# Patient Record
Sex: MALE | Race: WHITE | NOT HISPANIC OR LATINO
[De-identification: names, ages, dates, MRNs, and addresses within clinical notes are randomized per-mention and may not be internally consistent; named-entity substitution may affect disease eponyms.]

---

## 2017-03-30 ENCOUNTER — APPOINTMENT (OUTPATIENT)
Dept: PULMONOLOGY | Facility: CLINIC | Age: 52
End: 2017-03-30

## 2017-03-30 VITALS
HEIGHT: 70 IN | SYSTOLIC BLOOD PRESSURE: 120 MMHG | WEIGHT: 240 LBS | HEART RATE: 84 BPM | RESPIRATION RATE: 17 BRPM | BODY MASS INDEX: 34.36 KG/M2 | DIASTOLIC BLOOD PRESSURE: 80 MMHG | OXYGEN SATURATION: 97 %

## 2017-04-07 ENCOUNTER — APPOINTMENT (OUTPATIENT)
Dept: PSYCHIATRY | Facility: CLINIC | Age: 52
End: 2017-04-07

## 2017-04-07 DIAGNOSIS — Z87.891 PERSONAL HISTORY OF NICOTINE DEPENDENCE: ICD-10-CM

## 2017-04-07 RX ORDER — TOPIRAMATE 25 MG/1
25 TABLET, FILM COATED ORAL
Qty: 120 | Refills: 0 | Status: ACTIVE | COMMUNITY
Start: 2016-12-12

## 2017-04-07 RX ORDER — CLONAZEPAM 0.5 MG/1
0.5 TABLET ORAL
Qty: 30 | Refills: 0 | Status: COMPLETED | COMMUNITY
Start: 2016-10-28

## 2017-04-07 RX ORDER — ACETAMINOPHEN AND CODEINE 300; 30 MG/1; MG/1
300-30 TABLET ORAL
Qty: 10 | Refills: 0 | Status: COMPLETED | COMMUNITY
Start: 2016-10-19

## 2017-04-07 RX ORDER — CELECOXIB 100 MG/1
100 CAPSULE ORAL
Qty: 30 | Refills: 0 | Status: COMPLETED | COMMUNITY
Start: 2016-11-15

## 2017-04-07 RX ORDER — LORAZEPAM 1 MG/1
1 TABLET ORAL
Qty: 3 | Refills: 0 | Status: COMPLETED | COMMUNITY
Start: 2016-10-19

## 2017-04-07 RX ORDER — AMOXICILLIN 875 MG/1
875 TABLET, FILM COATED ORAL
Qty: 20 | Refills: 0 | Status: COMPLETED | COMMUNITY
Start: 2016-10-19

## 2017-04-11 ENCOUNTER — OUTPATIENT (OUTPATIENT)
Dept: OUTPATIENT SERVICES | Facility: HOSPITAL | Age: 52
LOS: 1 days | End: 2017-04-11
Payer: MEDICARE

## 2017-04-11 ENCOUNTER — APPOINTMENT (OUTPATIENT)
Dept: ULTRASOUND IMAGING | Facility: IMAGING CENTER | Age: 52
End: 2017-04-11

## 2017-04-11 DIAGNOSIS — Z00.8 ENCOUNTER FOR OTHER GENERAL EXAMINATION: ICD-10-CM

## 2017-04-11 PROCEDURE — 76700 US EXAM ABDOM COMPLETE: CPT

## 2017-05-02 ENCOUNTER — APPOINTMENT (OUTPATIENT)
Dept: PSYCHIATRY | Facility: CLINIC | Age: 52
End: 2017-05-02

## 2017-05-02 RX ORDER — COLCHICINE 0.6 MG/1
0.6 TABLET, FILM COATED ORAL
Qty: 10 | Refills: 0 | Status: ACTIVE | COMMUNITY
Start: 2017-04-25

## 2017-05-25 ENCOUNTER — APPOINTMENT (OUTPATIENT)
Dept: PSYCHIATRY | Facility: CLINIC | Age: 52
End: 2017-05-25

## 2017-06-06 ENCOUNTER — RX RENEWAL (OUTPATIENT)
Age: 52
End: 2017-06-06

## 2017-06-08 ENCOUNTER — APPOINTMENT (OUTPATIENT)
Dept: PSYCHIATRY | Facility: CLINIC | Age: 52
End: 2017-06-08

## 2017-06-08 RX ORDER — ESCITALOPRAM OXALATE 20 MG/1
20 TABLET ORAL
Qty: 30 | Refills: 0 | Status: COMPLETED | COMMUNITY
Start: 2017-05-02

## 2017-06-29 ENCOUNTER — APPOINTMENT (OUTPATIENT)
Dept: PSYCHIATRY | Facility: CLINIC | Age: 52
End: 2017-06-29

## 2017-07-20 ENCOUNTER — APPOINTMENT (OUTPATIENT)
Dept: PSYCHIATRY | Facility: CLINIC | Age: 52
End: 2017-07-20

## 2017-07-20 RX ORDER — DULOXETINE HYDROCHLORIDE 20 MG/1
20 CAPSULE, DELAYED RELEASE PELLETS ORAL
Qty: 30 | Refills: 0 | Status: COMPLETED | COMMUNITY
Start: 2017-06-08

## 2017-07-24 ENCOUNTER — RX RENEWAL (OUTPATIENT)
Age: 52
End: 2017-07-24

## 2017-07-24 ENCOUNTER — FORM ENCOUNTER (OUTPATIENT)
Age: 52
End: 2017-07-24

## 2017-07-25 ENCOUNTER — APPOINTMENT (OUTPATIENT)
Dept: CT IMAGING | Facility: IMAGING CENTER | Age: 52
End: 2017-07-25

## 2017-07-25 ENCOUNTER — OUTPATIENT (OUTPATIENT)
Dept: OUTPATIENT SERVICES | Facility: HOSPITAL | Age: 52
LOS: 1 days | End: 2017-07-25
Payer: MEDICARE

## 2017-07-25 DIAGNOSIS — R93.8 ABNORMAL FINDINGS ON DIAGNOSTIC IMAGING OF OTHER SPECIFIED BODY STRUCTURES: ICD-10-CM

## 2017-07-25 PROCEDURE — 71250 CT THORAX DX C-: CPT

## 2017-07-26 ENCOUNTER — RESULT REVIEW (OUTPATIENT)
Age: 52
End: 2017-07-26

## 2017-08-10 ENCOUNTER — APPOINTMENT (OUTPATIENT)
Dept: PSYCHIATRY | Facility: CLINIC | Age: 52
End: 2017-08-10
Payer: MEDICARE

## 2017-08-10 PROCEDURE — 99214 OFFICE O/P EST MOD 30 MIN: CPT

## 2017-08-31 ENCOUNTER — APPOINTMENT (OUTPATIENT)
Dept: PSYCHIATRY | Facility: CLINIC | Age: 52
End: 2017-08-31
Payer: MEDICARE

## 2017-08-31 PROCEDURE — 99214 OFFICE O/P EST MOD 30 MIN: CPT

## 2017-09-21 ENCOUNTER — APPOINTMENT (OUTPATIENT)
Dept: PSYCHIATRY | Facility: CLINIC | Age: 52
End: 2017-09-21
Payer: MEDICARE

## 2017-09-21 PROCEDURE — 99214 OFFICE O/P EST MOD 30 MIN: CPT

## 2017-09-21 RX ORDER — BENZONATATE 200 MG/1
200 CAPSULE ORAL
Qty: 15 | Refills: 0 | Status: COMPLETED | COMMUNITY
Start: 2017-06-01

## 2017-09-21 RX ORDER — DULOXETINE HYDROCHLORIDE 30 MG/1
30 CAPSULE, DELAYED RELEASE PELLETS ORAL
Qty: 30 | Refills: 0 | Status: COMPLETED | COMMUNITY
Start: 2017-06-29

## 2017-10-03 ENCOUNTER — MEDICATION RENEWAL (OUTPATIENT)
Age: 52
End: 2017-10-03

## 2017-10-05 ENCOUNTER — APPOINTMENT (OUTPATIENT)
Dept: PSYCHIATRY | Facility: CLINIC | Age: 52
End: 2017-10-05
Payer: MEDICARE

## 2017-10-05 PROCEDURE — 99214 OFFICE O/P EST MOD 30 MIN: CPT

## 2017-10-16 ENCOUNTER — RX RENEWAL (OUTPATIENT)
Age: 52
End: 2017-10-16

## 2017-10-19 ENCOUNTER — APPOINTMENT (OUTPATIENT)
Dept: PSYCHIATRY | Facility: CLINIC | Age: 52
End: 2017-10-19
Payer: MEDICARE

## 2017-10-19 PROCEDURE — 99214 OFFICE O/P EST MOD 30 MIN: CPT

## 2017-10-31 ENCOUNTER — APPOINTMENT (OUTPATIENT)
Dept: PULMONOLOGY | Facility: CLINIC | Age: 52
End: 2017-10-31
Payer: MEDICARE

## 2017-10-31 VITALS
HEIGHT: 68 IN | RESPIRATION RATE: 15 BRPM | SYSTOLIC BLOOD PRESSURE: 110 MMHG | HEART RATE: 102 BPM | OXYGEN SATURATION: 97 % | BODY MASS INDEX: 38.19 KG/M2 | DIASTOLIC BLOOD PRESSURE: 70 MMHG | WEIGHT: 252 LBS

## 2017-10-31 PROCEDURE — 99214 OFFICE O/P EST MOD 30 MIN: CPT | Mod: 25

## 2017-10-31 PROCEDURE — 94010 BREATHING CAPACITY TEST: CPT

## 2017-10-31 RX ORDER — FLUTICASONE FUROATE 200 UG/1
200 POWDER RESPIRATORY (INHALATION)
Qty: 3 | Refills: 1 | Status: ACTIVE | COMMUNITY
Start: 2017-10-31 | End: 1900-01-01

## 2017-11-30 ENCOUNTER — APPOINTMENT (OUTPATIENT)
Dept: PSYCHIATRY | Facility: CLINIC | Age: 52
End: 2017-11-30
Payer: MEDICARE

## 2017-11-30 PROCEDURE — 99214 OFFICE O/P EST MOD 30 MIN: CPT

## 2017-11-30 RX ORDER — DULOXETINE HYDROCHLORIDE 30 MG/1
30 CAPSULE, DELAYED RELEASE PELLETS ORAL
Qty: 30 | Refills: 0 | Status: COMPLETED | COMMUNITY
Start: 2017-09-21 | End: 2017-11-30

## 2017-11-30 RX ORDER — GABAPENTIN 100 MG/1
100 CAPSULE ORAL
Qty: 180 | Refills: 0 | Status: COMPLETED | COMMUNITY
Start: 2017-09-21

## 2017-12-14 ENCOUNTER — APPOINTMENT (OUTPATIENT)
Dept: PSYCHIATRY | Facility: CLINIC | Age: 52
End: 2017-12-14

## 2018-02-23 ENCOUNTER — APPOINTMENT (OUTPATIENT)
Dept: PSYCHIATRY | Facility: CLINIC | Age: 53
End: 2018-02-23
Payer: MEDICARE

## 2018-02-23 VITALS — HEART RATE: 98 BPM | SYSTOLIC BLOOD PRESSURE: 117 MMHG | DIASTOLIC BLOOD PRESSURE: 81 MMHG

## 2018-02-23 PROCEDURE — 99214 OFFICE O/P EST MOD 30 MIN: CPT

## 2018-02-23 RX ORDER — ALLOPURINOL 300 MG/1
300 TABLET ORAL
Qty: 90 | Refills: 0 | Status: ACTIVE | COMMUNITY
Start: 2018-01-18

## 2018-02-23 RX ORDER — GABAPENTIN 300 MG/1
300 CAPSULE ORAL TWICE DAILY
Qty: 60 | Refills: 1 | Status: COMPLETED | COMMUNITY
Start: 2017-08-10 | End: 2018-02-23

## 2018-02-23 RX ORDER — BENZONATATE 100 MG/1
100 CAPSULE ORAL
Qty: 21 | Refills: 0 | Status: COMPLETED | COMMUNITY
Start: 2018-02-13

## 2018-03-06 ENCOUNTER — APPOINTMENT (OUTPATIENT)
Dept: PULMONOLOGY | Facility: CLINIC | Age: 53
End: 2018-03-06
Payer: MEDICARE

## 2018-03-06 VITALS
WEIGHT: 248 LBS | DIASTOLIC BLOOD PRESSURE: 80 MMHG | OXYGEN SATURATION: 96 % | RESPIRATION RATE: 17 BRPM | HEART RATE: 98 BPM | SYSTOLIC BLOOD PRESSURE: 110 MMHG | HEIGHT: 70 IN | BODY MASS INDEX: 35.5 KG/M2

## 2018-03-06 DIAGNOSIS — Z15.01 GENETIC SUSCEPTIBILITY TO MALIGNANT NEOPLASM OF BREAST: ICD-10-CM

## 2018-03-06 DIAGNOSIS — Z15.09 GENETIC SUSCEPTIBILITY TO MALIGNANT NEOPLASM OF BREAST: ICD-10-CM

## 2018-03-06 PROCEDURE — 99214 OFFICE O/P EST MOD 30 MIN: CPT | Mod: 25

## 2018-03-06 PROCEDURE — 94010 BREATHING CAPACITY TEST: CPT

## 2018-03-19 ENCOUNTER — APPOINTMENT (OUTPATIENT)
Dept: PSYCHIATRY | Facility: CLINIC | Age: 53
End: 2018-03-19
Payer: MEDICARE

## 2018-03-19 PROCEDURE — 99214 OFFICE O/P EST MOD 30 MIN: CPT

## 2018-05-17 ENCOUNTER — APPOINTMENT (OUTPATIENT)
Dept: PSYCHIATRY | Facility: CLINIC | Age: 53
End: 2018-05-17
Payer: MEDICARE

## 2018-05-17 PROCEDURE — 99214 OFFICE O/P EST MOD 30 MIN: CPT

## 2018-07-10 ENCOUNTER — APPOINTMENT (OUTPATIENT)
Dept: PULMONOLOGY | Facility: CLINIC | Age: 53
End: 2018-07-10
Payer: MEDICARE

## 2018-07-10 ENCOUNTER — NON-APPOINTMENT (OUTPATIENT)
Age: 53
End: 2018-07-10

## 2018-07-10 VITALS
BODY MASS INDEX: 36.14 KG/M2 | RESPIRATION RATE: 14 BRPM | OXYGEN SATURATION: 95 % | WEIGHT: 244 LBS | HEART RATE: 96 BPM | HEIGHT: 69 IN | SYSTOLIC BLOOD PRESSURE: 122 MMHG | DIASTOLIC BLOOD PRESSURE: 80 MMHG

## 2018-07-10 PROCEDURE — 99214 OFFICE O/P EST MOD 30 MIN: CPT | Mod: 25

## 2018-07-10 PROCEDURE — 94010 BREATHING CAPACITY TEST: CPT

## 2018-07-26 ENCOUNTER — APPOINTMENT (OUTPATIENT)
Dept: PSYCHIATRY | Facility: CLINIC | Age: 53
End: 2018-07-26
Payer: MEDICARE

## 2018-07-26 PROCEDURE — 99214 OFFICE O/P EST MOD 30 MIN: CPT

## 2018-09-28 ENCOUNTER — RX RENEWAL (OUTPATIENT)
Age: 53
End: 2018-09-28

## 2018-10-11 ENCOUNTER — APPOINTMENT (OUTPATIENT)
Dept: PSYCHIATRY | Facility: CLINIC | Age: 53
End: 2018-10-11
Payer: MEDICARE

## 2018-10-11 PROCEDURE — 99214 OFFICE O/P EST MOD 30 MIN: CPT

## 2018-11-13 ENCOUNTER — APPOINTMENT (OUTPATIENT)
Dept: PULMONOLOGY | Facility: CLINIC | Age: 53
End: 2018-11-13
Payer: MEDICARE

## 2018-11-13 ENCOUNTER — NON-APPOINTMENT (OUTPATIENT)
Age: 53
End: 2018-11-13

## 2018-11-13 VITALS
WEIGHT: 243 LBS | OXYGEN SATURATION: 95 % | DIASTOLIC BLOOD PRESSURE: 70 MMHG | BODY MASS INDEX: 34.79 KG/M2 | HEIGHT: 70 IN | RESPIRATION RATE: 14 BRPM | HEART RATE: 91 BPM | SYSTOLIC BLOOD PRESSURE: 120 MMHG

## 2018-11-13 DIAGNOSIS — Z23 ENCOUNTER FOR IMMUNIZATION: ICD-10-CM

## 2018-11-13 PROCEDURE — 90682 RIV4 VACC RECOMBINANT DNA IM: CPT

## 2018-11-13 PROCEDURE — G0008: CPT

## 2018-11-13 PROCEDURE — 99214 OFFICE O/P EST MOD 30 MIN: CPT | Mod: 25

## 2018-11-13 PROCEDURE — 94060 EVALUATION OF WHEEZING: CPT

## 2018-11-13 PROCEDURE — 94729 DIFFUSING CAPACITY: CPT

## 2018-11-13 PROCEDURE — 94727 GAS DIL/WSHOT DETER LNG VOL: CPT

## 2019-01-02 ENCOUNTER — MEDICATION RENEWAL (OUTPATIENT)
Age: 54
End: 2019-01-02

## 2019-01-08 ENCOUNTER — APPOINTMENT (OUTPATIENT)
Dept: PSYCHIATRY | Facility: CLINIC | Age: 54
End: 2019-01-08
Payer: MEDICARE

## 2019-01-08 PROCEDURE — 99214 OFFICE O/P EST MOD 30 MIN: CPT

## 2019-01-15 ENCOUNTER — MEDICATION RENEWAL (OUTPATIENT)
Age: 54
End: 2019-01-15

## 2019-03-18 ENCOUNTER — APPOINTMENT (OUTPATIENT)
Dept: PULMONOLOGY | Facility: CLINIC | Age: 54
End: 2019-03-18
Payer: MEDICARE

## 2019-03-18 ENCOUNTER — NON-APPOINTMENT (OUTPATIENT)
Age: 54
End: 2019-03-18

## 2019-03-18 VITALS
SYSTOLIC BLOOD PRESSURE: 116 MMHG | BODY MASS INDEX: 35.61 KG/M2 | WEIGHT: 235 LBS | DIASTOLIC BLOOD PRESSURE: 70 MMHG | RESPIRATION RATE: 17 BRPM | OXYGEN SATURATION: 97 % | HEART RATE: 78 BPM | HEIGHT: 68 IN

## 2019-03-18 PROCEDURE — 99214 OFFICE O/P EST MOD 30 MIN: CPT | Mod: 25

## 2019-03-18 PROCEDURE — 94010 BREATHING CAPACITY TEST: CPT

## 2019-03-18 NOTE — REVIEW OF SYSTEMS
[Recent Wt Loss (___ Lbs)] : recent [unfilled] ~Ulb weight loss [Dyspnea] : dyspnea [Rash] : [unfilled] rash [Itch] : itching [As Noted in HPI] : as noted in HPI [Anxiety] : anxiety [Negative] : Sleep Disorder

## 2019-03-18 NOTE — PROCEDURE
[FreeTextEntry1] : PFT revealed mild restrictive dysfunction, normal flows, with a FEV1 of 2.83  ,which is 74 % of predicted, with a flattened inspiratory limb \par

## 2019-03-18 NOTE — PHYSICAL EXAM
[General Appearance - Well Developed] : well developed [Normal Appearance] : normal appearance [Well Groomed] : well groomed [General Appearance - Well Nourished] : well nourished [No Deformities] : no deformities [General Appearance - In No Acute Distress] : no acute distress [Normal Conjunctiva] : the conjunctiva exhibited no abnormalities [Eyelids - No Xanthelasma] : the eyelids demonstrated no xanthelasmas [Normal Oropharynx] : normal oropharynx [III] : III [Neck Appearance] : the appearance of the neck was normal [Neck Cervical Mass (___cm)] : no neck mass was observed [Jugular Venous Distention Increased] : there was no jugular-venous distention [Thyroid Diffuse Enlargement] : the thyroid was not enlarged [Thyroid Nodule] : there were no palpable thyroid nodules [Heart Rate And Rhythm] : heart rate and rhythm were normal [Heart Sounds] : normal S1 and S2 [Murmurs] : no murmurs present [Respiration, Rhythm And Depth] : normal respiratory rhythm and effort [Exaggerated Use Of Accessory Muscles For Inspiration] : no accessory muscle use [Auscultation Breath Sounds / Voice Sounds] : lungs were clear to auscultation bilaterally [Abdomen Soft] : soft [Abdomen Tenderness] : non-tender [Abdomen Mass (___ Cm)] : no abdominal mass palpated [Abnormal Walk] : normal gait [Gait - Sufficient For Exercise Testing] : the gait was sufficient for exercise testing [Nail Clubbing] : no clubbing of the fingernails [Cyanosis, Localized] : no localized cyanosis [Petechial Hemorrhages (___cm)] : no petechial hemorrhages [Skin Color & Pigmentation] : normal skin color and pigmentation [] : no rash [No Venous Stasis] : no venous stasis [Skin Lesions] : no skin lesions [No Skin Ulcers] : no skin ulcer [No Xanthoma] : no  xanthoma was observed [Deep Tendon Reflexes (DTR)] : deep tendon reflexes were 2+ and symmetric [Sensation] : the sensory exam was normal to light touch and pinprick [No Focal Deficits] : no focal deficits [Oriented To Time, Place, And Person] : oriented to person, place, and time [Impaired Insight] : insight and judgment were intact [Affect] : the affect was normal [FreeTextEntry1] : rash on leg

## 2019-03-18 NOTE — ADDENDUM
[FreeTextEntry1] : Documented by Guy Delgadillo acting as a scribe for Dr. Adam Blandon on 03/18/2019 \par \par All medical record entries made by the Scribe were at my, Dr. Adam Blandon's, direction and personally dictated by me on 03/18/2019  . I have reviewed the chart and agree that the record accurately reflects my personal performance of the history, physical exam, procedure, assessment and plan. I have also personally directed, reviewed, and agree with the discharge instructions. \par \par

## 2019-03-18 NOTE — ASSESSMENT
[FreeTextEntry1] : Mr. Lombardo has a history of asthma, allergies, GERD, primary snoring and shortness of breath. He is stable from a pulmonary perspective, aside from some shortness of breath at rest (?posture/ stress)\par \par His shortness of breath is multifactorial due to:\par -poor breathing mechanics\par -asthma\par -overweight\par -out of shape\par -anxiety\par -? CAD\par \par problem 1: asthma\par -continue to use Anoro at 1 inhalation QD\par -continue to use Arnuity 200 at 1 inhalation QD\par -continue Singulair 10 mg QHS \par -continue Ventolin 2 puffs QHS\par -Asthma is  believed to be caused by inherited (genetic) and environmental factor, but its exact cause is unknown. Asthma may be triggered by allergens, lung infections, or irritants in the air. Asthma triggers are different for each person\par -Inhaler technique reviewed as well as oral hygiene techniques reviewed with patient. Avoidance of cold air, extremes of temperature, rescue inhaler should be used before exercise. Order of medication reviewed with patient. Recommended use of a cool mist humidifier in the bedroom.\par \par problem 2: GERD, hiatal hernia \par -continue to use Omeprazole 40 mg before breakfast\par -Rule of 2s: avoid eating too much, eating too late, eating too spicy, eating two hours before bed\par -Things to avoid including overeating, spicy foods, tight clothing, eating within three hours of bed, this list is not all inclusive. \par -For treatment of reflux, possible options discussed including diet control, H2 blockers, PPIs, as well as coating motility agents discussed as treatment options. Timing of meals and proximity of last meal to sleep were discussed. If symptoms persist, a formal gastrointestinal evaluation is needed.\par \par problem 3: primary snoring\par -recommended to use Oxy Aid\par \par problem 4: poor sleep\par -f/u with psychiatrist\par -recommended Sleep Guard \par \par problem 5: allergies\par -continue to use Clarinex 5 mg before bed\par -Environmental measures for allergies were encouraged including mattress and pillow cover, air purifier, and environmental controls.\par \par problem 6: poor breathing mechanics\par -Proper breathing techniques were reviewed with an emphasis of exhalation. Patient instructed to breath in for 1 second and out for four seconds. Patient was encouraged to not talk while walking.\par \par problem 7: obesity \par -Weight loss, exercise, and diet control were discussed and are highly encouraged. Treatment options were given such as, aqua therapy, and contacting a nutritionist. Recommended to use the elliptical, stationary bike, less use of treadmill.  Obesity is associated with worsening asthma, shortness of breath, and potential for cardiac disease, diabetes, and other underlying medical conditions.\par \par problem 8: anxiety\par -recommended to see Dr. Gu \par -recommended to read "The Gift of Maybe"\par \par problem 9: health maintenance\par -recommended Well care and cancer screening in the face of BRCA positive (Hematology evaluation pending) \par -s/p 2018 flu shot (given in office 11.13.18)\par -recommended strep pneumonia vaccines: Prevnar-13 vaccine, followed by Pneumo vaccine 23 on year following\par -recommended early intervention for URIs\par -recommended osteoporosis evaluations\par -recommended early dermatological evaluations\par -recommended after the age of 50 to the age of 70, colonoscopy every 5 years\par \par F/U in 3 months\par He is encouraged to call with any changes, concerns, or questions.

## 2019-03-18 NOTE — REASON FOR VISIT
[Follow-Up] : a follow-up visit [FreeTextEntry1] : abnormal chest CT, abnormal PFTs, asthma, GERD, lung fibrosis, poor sleep, primary snoring and shortness of breath

## 2019-04-02 ENCOUNTER — APPOINTMENT (OUTPATIENT)
Dept: PSYCHIATRY | Facility: CLINIC | Age: 54
End: 2019-04-02
Payer: MEDICARE

## 2019-04-02 PROCEDURE — 99214 OFFICE O/P EST MOD 30 MIN: CPT

## 2019-07-02 ENCOUNTER — APPOINTMENT (OUTPATIENT)
Dept: PSYCHIATRY | Facility: CLINIC | Age: 54
End: 2019-07-02
Payer: MEDICARE

## 2019-07-02 ENCOUNTER — RX RENEWAL (OUTPATIENT)
Age: 54
End: 2019-07-02

## 2019-07-02 PROCEDURE — 99214 OFFICE O/P EST MOD 30 MIN: CPT

## 2019-07-18 ENCOUNTER — NON-APPOINTMENT (OUTPATIENT)
Age: 54
End: 2019-07-18

## 2019-07-18 ENCOUNTER — APPOINTMENT (OUTPATIENT)
Dept: PULMONOLOGY | Facility: CLINIC | Age: 54
End: 2019-07-18
Payer: MEDICARE

## 2019-07-18 VITALS
HEIGHT: 68 IN | BODY MASS INDEX: 35.16 KG/M2 | HEART RATE: 71 BPM | OXYGEN SATURATION: 98 % | DIASTOLIC BLOOD PRESSURE: 70 MMHG | WEIGHT: 232 LBS | SYSTOLIC BLOOD PRESSURE: 130 MMHG | RESPIRATION RATE: 17 BRPM

## 2019-07-18 PROCEDURE — 99214 OFFICE O/P EST MOD 30 MIN: CPT | Mod: 25

## 2019-07-18 PROCEDURE — 94010 BREATHING CAPACITY TEST: CPT

## 2019-07-18 NOTE — REASON FOR VISIT
[Follow-Up] : a follow-up visit [FreeTextEntry1] : abnormal chest CT/PFT, allergic rhinitis, asthma, fibrosis lung, GERD, obesity, poor sleep, primary snoring, and SOB

## 2019-07-18 NOTE — ASSESSMENT
[FreeTextEntry1] : Mr. Lombardo has a history of asthma, allergies, GERD, primary snoring and shortness of breath. He is stable from a pulmonary perspective, aside from some shortness of breath at rest (?posture/ stress) - His number one issue is anxiety. \par \par His shortness of breath is multifactorial due to:\par -poor breathing mechanics\par -asthma\par -overweight / out of shape\par -anxiety\par -? CAD\par \par problem 1: asthma - (active due to climate)\par -continue to use Anoro at 1 inhalation QD\par -continue to use Arnuity 200 at 1 inhalation QD\par -continue Singulair 10 mg QHS \par -continue Ventolin 2 puffs QHS\par -Asthma is  believed to be caused by inherited (genetic) and environmental factor, but its exact cause is unknown. Asthma may be triggered by allergens, lung infections, or irritants in the air. Asthma triggers are different for each person\par -Inhaler technique reviewed as well as oral hygiene techniques reviewed with patient. Avoidance of cold air, extremes of temperature, rescue inhaler should be used before exercise. Order of medication reviewed with patient. Recommended use of a cool mist humidifier in the bedroom.\par \par problem 2: GERD, hiatal hernia \par -continue to use Omeprazole 40 mg before breakfast\par -Rule of 2s: avoid eating too much, eating too late, eating too spicy, eating two hours before bed\par -Things to avoid including overeating, spicy foods, tight clothing, eating within three hours of bed, this list is not all inclusive. \par -For treatment of reflux, possible options discussed including diet control, H2 blockers, PPIs, as well as coating motility agents discussed as treatment options. Timing of meals and proximity of last meal to sleep were discussed. If symptoms persist, a formal gastrointestinal evaluation is needed.\par \par problem 3: primary snoring\par -recommended to use Oxy Aid\par \par problem 4: poor sleep\par -f/u with psychiatrist\par -recommended Sleep Guard \par \par problem 5: allergies\par -continue to use Clarinex 5 mg before bed\par -Environmental measures for allergies were encouraged including mattress and pillow cover, air purifier, and environmental controls.\par \par problem 6: poor breathing mechanics\par -Proper breathing techniques were reviewed with an emphasis of exhalation. Patient instructed to breath in for 1 second and out for four seconds. Patient was encouraged to not talk while walking.\par \par problem 7: obesity \par -Weight loss, exercise, and diet control were discussed and are highly encouraged. Treatment options were given such as, aqua therapy, and contacting a nutritionist. Recommended to use the elliptical, stationary bike, less use of treadmill.  Obesity is associated with worsening asthma, shortness of breath, and potential for cardiac disease, diabetes, and other underlying medical conditions.\par \par problem 8: anxiety\par -recommended to see Dr. Gu \par -recommended to read "The Gift of Maybe"\par \par problem 9: health maintenance\par -recommended Well care and cancer screening in the face of BRCA positive (Hematology evaluation pending) \par -s/p 2018 flu shot (given in office 11.13.18)\par -recommended strep pneumonia vaccines: Prevnar-13 vaccine, followed by Pneumo vaccine 23 on year following\par -recommended early intervention for URIs\par -recommended osteoporosis evaluations\par -recommended early dermatological evaluations\par -recommended after the age of 50 to the age of 70, colonoscopy every 5 years\par \par F/U in 4 months - [facility] \par He is encouraged to call with any changes, concerns, or questions.

## 2019-07-18 NOTE — PROCEDURE
[FreeTextEntry1] : PFT - spi reveals mild restrictive dysfunction;  FEV1 is 2.72 which is 76% of predicted, irregular inspiratory limb

## 2019-07-18 NOTE — ADDENDUM
[FreeTextEntry1] : All medical record entries made by kindra Uribe were at Dr. Adam Blandon's, direction and personally dictated by me on 07/18/2019. I have reviewed the chart and agree that the record accurately reflects my personal performance of the history, physical exam, assessment and plan. I have also personally directed, reviewed, and agree with the discharge instructions.

## 2019-09-23 ENCOUNTER — APPOINTMENT (OUTPATIENT)
Dept: PSYCHIATRY | Facility: CLINIC | Age: 54
End: 2019-09-23
Payer: MEDICARE

## 2019-09-23 PROCEDURE — 99214 OFFICE O/P EST MOD 30 MIN: CPT

## 2019-11-21 ENCOUNTER — APPOINTMENT (OUTPATIENT)
Dept: PULMONOLOGY | Facility: CLINIC | Age: 54
End: 2019-11-21
Payer: MEDICARE

## 2019-11-21 ENCOUNTER — NON-APPOINTMENT (OUTPATIENT)
Age: 54
End: 2019-11-21

## 2019-11-21 VITALS
RESPIRATION RATE: 17 BRPM | DIASTOLIC BLOOD PRESSURE: 80 MMHG | SYSTOLIC BLOOD PRESSURE: 120 MMHG | HEART RATE: 70 BPM | WEIGHT: 235 LBS | BODY MASS INDEX: 35.61 KG/M2 | HEIGHT: 68 IN | OXYGEN SATURATION: 97 %

## 2019-11-21 DIAGNOSIS — R06.83 SNORING: ICD-10-CM

## 2019-11-21 PROCEDURE — 94010 BREATHING CAPACITY TEST: CPT

## 2019-11-21 PROCEDURE — G0008: CPT

## 2019-11-21 PROCEDURE — 90682 RIV4 VACC RECOMBINANT DNA IM: CPT

## 2019-11-21 PROCEDURE — 99214 OFFICE O/P EST MOD 30 MIN: CPT | Mod: 25

## 2019-11-21 RX ORDER — UMECLIDINIUM BROMIDE AND VILANTEROL TRIFENATATE 62.5; 25 UG/1; UG/1
62.5-25 POWDER RESPIRATORY (INHALATION) DAILY
Qty: 3 | Refills: 1 | Status: DISCONTINUED | COMMUNITY
Start: 2019-07-18 | End: 2019-11-21

## 2019-11-21 RX ORDER — FLUTICASONE FUROATE 200 UG/1
200 POWDER RESPIRATORY (INHALATION)
Qty: 90 | Refills: 1 | Status: DISCONTINUED | COMMUNITY
Start: 2017-04-14 | End: 2019-11-21

## 2019-11-21 NOTE — ASSESSMENT
[FreeTextEntry1] : Mr. Lombardo has a history of asthma, allergies, GERD, primary snoring and shortness of breath. He is stable from a pulmonary perspective, aside from some shortness of breath at rest (?posture/ stress/ likely mechanical) - His number one issue is anxiety / poor sleep.\par \par His shortness of breath is multifactorial due to:\par -poor breathing mechanics\par -asthma\par -overweight / out of shape\par -anxiety\par -?CAD\par \par ************************************************Preoperative Pulmonary Clearance************************************************\par -colonoscopy\par -at this point in time there are no absolute pulmonary contraindications to go forward with the planned procedure \par -at the time of surgery s/he should have optimal pain control, incentive spirometry, early ambulation, DVT and GI prophylaxis. \par \par problem 1: asthma -(controlled)\par -continue Anoro at 1 inhalation QD\par -continue Arnuity 200 at 1 inhalation QD\par -continue Singulair 10 mg QHS \par -continue Ventolin 2 puffs QHS\par -Asthma is  believed to be caused by inherited (genetic) and environmental factor, but its exact cause is unknown. Asthma may be triggered by allergens, lung infections, or irritants in the air. Asthma triggers are different for each person\par -Inhaler technique reviewed as well as oral hygiene techniques reviewed with patient. Avoidance of cold air, extremes of temperature, rescue inhaler should be used before exercise. Order of medication reviewed with patient. Recommended use of a cool mist humidifier in the bedroom.\par \par problem 2: GERD, hiatal hernia \par -continue Omeprazole 40 mg before breakfast\par -Rule of 2s: avoid eating too much, eating too late, eating too spicy, eating two hours before bed\par -Things to avoid including overeating, spicy foods, tight clothing, eating within three hours of bed, this list is not all inclusive. \par -For treatment of reflux, possible options discussed including diet control, H2 blockers, PPIs, as well as coating motility agents discussed as treatment options. Timing of meals and proximity of last meal to sleep were discussed. If symptoms persist, a formal gastrointestinal evaluation is needed.\par \par problem 3: primary snoring\par -recommended to use Oxy Aid\par Good sleep hygiene was encouraged including avoiding watching television an hour before bed, keeping caffeine at a low, avoiding reading, television, or anything, in bed, no drinking any liquids three hours before bedtime, and only getting into bed when tired and ready for sleep. \par \par problem 4: poor sleep\par -f/u with psychiatrist\par -recommended Sleep Guard \par \par problem 5: allergies\par -continue Clarinex 5 mg QHS\par -Environmental measures for allergies were encouraged including mattress and pillow cover, air purifier, and environmental controls.\par \par problem 6: poor breathing mechanics\par -Proper breathing techniques were reviewed with an emphasis of exhalation. Patient instructed to breath in for 1 second and out for four seconds. Patient was encouraged to not talk while walking.\par \par problem 7: obesity \par -Weight loss, exercise, and diet control were discussed and are highly encouraged. Treatment options were given such as, aqua therapy, and contacting a nutritionist. Recommended to use the elliptical, stationary bike, less use of treadmill.  Obesity is associated with worsening asthma, shortness of breath, and potential for cardiac disease, diabetes, and other underlying medical conditions.\par \par problem 8: anxiety\par -recommended to see Dr. Gu \par -recommended to read "The Gift of Maybe"\par \par problem 9: health maintenance\par -recommended Well care and cancer screening in the face of BRCA positive (Hematology evaluation pending) \par -s/p 2019 flu shot (given in office 11.21.19)\par -recommended strep pneumonia vaccines: Prevnar-13 vaccine, followed by Pneumo vaccine 23 on year following\par -recommended early intervention for URIs\par -recommended osteoporosis evaluations\par -recommended early dermatological evaluations\par -recommended after the age of 50 to the age of 70, colonoscopy every 5 years\par \par F/U in 4 months - [facility] \par He is encouraged to call with any changes, concerns, or questions.

## 2019-11-21 NOTE — ADDENDUM
[FreeTextEntry1] : All medical record entries made by kindra Uribe were at Dr. Adam Blandon's direction and personally dictated by me on 11/21/2019. I have reviewed the chart and agree that the record accurately reflects my personal performance of the history, physical exam, assessment and plan. I have also personally directed, reviewed, and agree with the discharge instructions.

## 2019-11-21 NOTE — HISTORY OF PRESENT ILLNESS
[FreeTextEntry1] : Mr. Lombardo is a 53 year old male with a history of abnormal chest CT/PFT, allergic rhinitis, asthma, fibrosis lung, GERD, obesity, poor sleep, primary snoring, and SOB presenting to the office today for a follow up visit. His chief complaint is poor sleep.\par -He states that he has generally been feeling well \par -he reports occasional episodes of dizziness\par -he repots that he has not been sleeping particularly well. he often has difficulty initiating sleep\par -he states that he has been having some knee pains due to meniscus issues\par -he reports that he occasionally feels SOB if he has been talking for a while \par -he notes that he has recently lost about 10 lbs\par -he has not been using any new medications / supplements \par -he reports that he has reduced his Omeprazole from 2 to 1 per day\par -he will be going for a colonoscopy soon\par -his sinuses have been clear\par -he has been f/u with his ophthalmologist regularly, and he was recently given reading glasses. \par -he denies any headaches, nausea, vomiting, fever, chills, sweats, chest pain, chest pressure, diarrhea, constipation, dysphagia, leg swelling, leg pain, itchy eyes, itchy ears, or sour taste in the mouth, cough, wheeze, SOB on back or at night.

## 2019-11-21 NOTE — PROCEDURE
[FreeTextEntry1] : PFT - spi reveals normal flows; FEV1 is 3.00 which is 85% of predicted, normal flow volume loop

## 2019-11-21 NOTE — REASON FOR VISIT
[Follow-Up] : a follow-up visit [Parent] : parent [FreeTextEntry1] : abnormal chest CT/PFT, allergic rhinitis, asthma, fibrosis lung, GERD, obesity, poor sleep, primary snoring, and SOB

## 2019-12-13 ENCOUNTER — RX RENEWAL (OUTPATIENT)
Age: 54
End: 2019-12-13

## 2019-12-16 ENCOUNTER — APPOINTMENT (OUTPATIENT)
Dept: PSYCHIATRY | Facility: CLINIC | Age: 54
End: 2019-12-16
Payer: MEDICARE

## 2019-12-16 PROCEDURE — 99214 OFFICE O/P EST MOD 30 MIN: CPT

## 2020-01-10 ENCOUNTER — MEDICATION RENEWAL (OUTPATIENT)
Age: 55
End: 2020-01-10

## 2020-01-21 ENCOUNTER — RX RENEWAL (OUTPATIENT)
Age: 55
End: 2020-01-21

## 2020-03-20 ENCOUNTER — APPOINTMENT (OUTPATIENT)
Dept: PSYCHIATRY | Facility: CLINIC | Age: 55
End: 2020-03-20
Payer: MEDICARE

## 2020-03-20 PROCEDURE — 99442: CPT

## 2020-03-24 ENCOUNTER — APPOINTMENT (OUTPATIENT)
Dept: PULMONOLOGY | Facility: CLINIC | Age: 55
End: 2020-03-24

## 2020-04-07 ENCOUNTER — RX RENEWAL (OUTPATIENT)
Age: 55
End: 2020-04-07

## 2020-05-05 ENCOUNTER — APPOINTMENT (OUTPATIENT)
Dept: PULMONOLOGY | Facility: CLINIC | Age: 55
End: 2020-05-05
Payer: MEDICARE

## 2020-05-05 PROCEDURE — 99214 OFFICE O/P EST MOD 30 MIN: CPT | Mod: 95

## 2020-05-05 NOTE — HISTORY OF PRESENT ILLNESS
[Home] : at home, [unfilled] , at the time of the visit. [Patient] : the patient [Medical Office: ([facility])___] : at the medical office located in  [Self] : self [FreeTextEntry2] : JOHN LOMBARDO  [FreeTextEntry1] : Mr. Lombardo is a 53 year old male with a history of abnormal chest CT/PFT, allergic rhinitis, asthma, fibrosis lung, GERD, obesity, poor sleep, primary snoring, and SOB presenting to the office today for a follow up visit. His chief complaint is\par - he notes he's been doing well\par - he notes he has not been going out too much due to the weather \par - he denies any chest pain / pressure \par - he notes his energy levels are 6 out of 10 \par - he notes he has been sleeping alright, but he does not feel like he gets enough sleep at night, but he makes up for it in the morning. \par - he notes his eyes have been starting to itch \par - he notes he wants to start walking more, he's been walking around the backyard\par - he denies any changes in meds / vitamins / supplements  \par -he denies any headaches, nausea, vomiting, fever, chills, sweats, chest pain, chest pressure, diarrhea, constipation, dysphagia, dizziness, sour taste in the mouth, leg swelling, leg pain, itchy eyes, itchy ears.

## 2020-05-05 NOTE — REASON FOR VISIT
[Follow-Up] : a follow-up visit [Parent] : parent [FreeTextEntry1] : video call - abnormal chest CT/PFT, allergic rhinitis, asthma, fibrosis lung, GERD, obesity, poor sleep, primary snoring, and SOB

## 2020-05-05 NOTE — ADDENDUM
[FreeTextEntry1] : Documented by Ana Rosales acting as a scribe for Dr. Adam Blandon on 05/05/2020.\par \par All medical record entries made by the Scribe were at my, Dr. Adam Blandon's, direction and personally dictated by me on 05/05/2020. I have reviewed the chart and agree that the record accurately reflects my personal performance of the history, physical exam, assessment and plan. I have also personally directed, reviewed, and agree with the discharge instructions.

## 2020-05-05 NOTE — PHYSICAL EXAM
[No Acute Distress] : no acute distress [Normal Appearance] : normal appearance [Well Nourished] : well nourished [No Deformities] : no deformities [Well Developed] : well developed [Well Groomed] : well groomed

## 2020-05-05 NOTE — ASSESSMENT
[FreeTextEntry1] : Mr. Lombardo is a 54 year old male who has a history of asthma, allergies, GERD, primary snoring and shortness of breath. He is stable from a pulmonary perspective, aside from some shortness of breath at rest (?posture/ stress/ likely mechanical) - His number one issue is anxiety / poor sleep.\par \par His shortness of breath is multifactorial due to:\par -poor breathing mechanics\par -asthma\par -overweight / out of shape\par -anxiety\par -?CAD\par \par ************************************************Preoperative Pulmonary Clearance************************************************\par -colonoscopy - on hold \par -at this point in time there are no absolute pulmonary contraindications to go forward with the planned procedure \par -at the time of surgery s/he should have optimal pain control, incentive spirometry, early ambulation, DVT and GI prophylaxis. \par \par problem 1: asthma -(controlled)\par -continue Anoro at 1 inhalation QD\par -continue Arnuity 200 at 1 inhalation QD\par -continue Singulair 10 mg QHS \par -continue Ventolin 2 puffs QHS\par -Asthma is  believed to be caused by inherited (genetic) and environmental factor, but its exact cause is unknown. Asthma may be triggered by allergens, lung infections, or irritants in the air. Asthma triggers are different for each person\par -Inhaler technique reviewed as well as oral hygiene techniques reviewed with patient. Avoidance of cold air, extremes of temperature, rescue inhaler should be used before exercise. Order of medication reviewed with patient. Recommended use of a cool mist humidifier in the bedroom.\par \par problem 2: GERD, hiatal hernia \par -continue Omeprazole 40 mg before breakfast\par -Rule of 2s: avoid eating too much, eating too late, eating too spicy, eating two hours before bed\par -Things to avoid including overeating, spicy foods, tight clothing, eating within three hours of bed, this list is not all inclusive. \par -For treatment of reflux, possible options discussed including diet control, H2 blockers, PPIs, as well as coating motility agents discussed as treatment options. Timing of meals and proximity of last meal to sleep were discussed. If symptoms persist, a formal gastrointestinal evaluation is needed.\par \par problem 3: primary snoring\par -recommended to use Oxy Aid\par Good sleep hygiene was encouraged including avoiding watching television an hour before bed, keeping caffeine at a low, avoiding reading, television, or anything, in bed, no drinking any liquids three hours before bedtime, and only getting into bed when tired and ready for sleep. \par \par problem 4: poor sleep\par -f/u with psychiatrist\par -recommended Sleep Guard \par \par problem 5: allergies\par -continue Clarinex 5 mg QHS\par -Environmental measures for allergies were encouraged including mattress and pillow cover, air purifier, and environmental controls.\par \par problem 6: poor breathing mechanics\par -Proper breathing techniques were reviewed with an emphasis of exhalation. Patient instructed to breath in for 1 second and out for four seconds. Patient was encouraged to not talk while walking.\par \par problem 7: obesity - need to improve\par -Weight loss, exercise, and diet control were discussed and are highly encouraged. Treatment options were given such as, aqua therapy, and contacting a nutritionist. Recommended to use the elliptical, stationary bike, less use of treadmill.  Obesity is associated with worsening asthma, shortness of breath, and potential for cardiac disease, diabetes, and other underlying medical conditions.\par \par problem 8: anxiety\par -recommended to see Dr. Gu \par -recommended to read "The Gift of Maybe"\par \par Problem 9: Health Maintenance/COVID19 Precautions:\par - Clean your hands often. Wash your hands often with soap and water for at least 20 seconds, especially after blowing your nose, coughing, or sneezing, or having been in a public place.\par - If soap and water are not available, use a hand  that contains at least 60% alcohol.\par - To the extent possible, avoid touching high-touch surfaces in public places - elevator buttons, door handles, handrails, handshaking with people, etc. Use a tissue or your sleeve to cover your hand or finger if you must touch something.\par - Wash your hands after touching surfaces in public places.\par - Avoid touching your face, nose, eyes, etc.\par - Clean and disinfect your home to remove germs: practice routine cleaning of frequently touched surfaces (for example: tables, doorknobs, light switches, handles, desks, toilets, faucets, sinks & cell phones)\par - Avoid crowds, especially in poorly ventilated spaces. Your risk of exposure to respiratory viruses like COVID-19 may increase in crowded, closed-in settings with little air circulation if there are people in the crowd who are sick. All patients are recommended to practice social distancing and stay at least 6 feet away from others.\par - Avoid all non-essential travel including plane trips, and especially avoid embarking on cruise ships.\par -If COVID-19 is spreading in your community, take extra measures to put distance between yourself and other people to further reduce your risk of being exposed to this new virus.\par -Stay home as much as possible.\par - Consider ways of getting food brought to your house through family, social, or commercial networks\par -Be aware that the virus has been known to live in the air up to 3 hours post exposure, cardboard up to 24 hours post exposure, copper up to 4 hours post exposure, steel and plastic up to 2-3 days post exposure. Risk of transmission from these surfaces are affected by many variables.\par Immune Support Recommendations:\par -OTC Vitamin C 500mg BID \par -OTC Quercetin 250-500mg BID \par -OTC Zinc 75-100mg per day \par -OTC Melatonin 1 or 2 mg a night \par -OTC Vitamin D 1-4000mg per day \par -OTC Tonic Water 8oz per day\par Asthma and COVID19:\par You need to make sure your asthma is under control. This often requires the use of inhaled corticosteroids (and sometimes oral corticosteroids). Inhaled corticosteroids do not likely reduce your immune system’s ability to fight infections, but oral corticosteroids may. It is important to use the steps above to protect yourself to limit your exposure to any respiratory virus.\par \par problem 10: health maintenance\par -recommended Well care and cancer screening in the face of BRCA positive (Hematology evaluation pending) \par -s/p 2019 flu shot (given in office 11.21.19)\par -recommended strep pneumonia vaccines: Prevnar-13 vaccine, followed by Pneumo vaccine 23 on year following\par -recommended early intervention for URIs\par -recommended osteoporosis evaluations\par -recommended early dermatological evaluations\par -recommended after the age of 50 to the age of 70, colonoscopy every 5 years\par \par F/U in 4 months - [facility] \par He is encouraged to call with any changes, concerns, or questions.

## 2020-07-01 ENCOUNTER — APPOINTMENT (OUTPATIENT)
Dept: PSYCHIATRY | Facility: CLINIC | Age: 55
End: 2020-07-01
Payer: MEDICARE

## 2020-07-01 PROCEDURE — 99214 OFFICE O/P EST MOD 30 MIN: CPT

## 2020-08-03 ENCOUNTER — APPOINTMENT (OUTPATIENT)
Dept: PULMONOLOGY | Facility: CLINIC | Age: 55
End: 2020-08-03
Payer: MEDICARE

## 2020-08-03 VITALS
DIASTOLIC BLOOD PRESSURE: 70 MMHG | BODY MASS INDEX: 36.68 KG/M2 | SYSTOLIC BLOOD PRESSURE: 120 MMHG | HEIGHT: 68 IN | WEIGHT: 242 LBS | TEMPERATURE: 97 F | RESPIRATION RATE: 16 BRPM | HEART RATE: 80 BPM | OXYGEN SATURATION: 97 %

## 2020-08-03 PROCEDURE — 94618 PULMONARY STRESS TESTING: CPT

## 2020-08-03 PROCEDURE — 99214 OFFICE O/P EST MOD 30 MIN: CPT | Mod: 25

## 2020-08-03 NOTE — ASSESSMENT
[FreeTextEntry1] : Mr. Lombardo is a 54 year old male who has a history of asthma, allergies, GERD, primary snoring and shortness of breath. He is stable from a pulmonary perspective, aside from some shortness of breath at rest (?posture/ stress/ likely mechanical) - His number one issue is anxiety / poor sleep - due to family (Breast CA)\par \par His shortness of breath is multifactorial due to:\par -poor breathing mechanics\par -asthma\par -overweight / out of shape\par -anxiety\par -?CAD\par \par ************************************************Preoperative Pulmonary Clearance************************************************\par -colonoscopy - on hold (still)\par -at this point in time there are no absolute pulmonary contraindications to go forward with the planned procedure \par -at the time of surgery s/he should have optimal pain control, incentive spirometry, early ambulation, DVT and GI prophylaxis. \par \par problem 1: asthma -(controlled)\par -continue Anoro at 1 inhalation QD\par -continue Arnuity 200 at 1 inhalation QD\par -continue Singulair 10 mg QHS \par -continue Ventolin 2 puffs QHS\par -Asthma is  believed to be caused by inherited (genetic) and environmental factor, but its exact cause is unknown. Asthma may be triggered by allergens, lung infections, or irritants in the air. Asthma triggers are different for each person\par -Inhaler technique reviewed as well as oral hygiene techniques reviewed with patient. Avoidance of cold air, extremes of temperature, rescue inhaler should be used before exercise. Order of medication reviewed with patient. Recommended use of a cool mist humidifier in the bedroom.\par \par problem 2: GERD, hiatal hernia \par -continue Omeprazole 40 mg before breakfast\par -Rule of 2s: avoid eating too much, eating too late, eating too spicy, eating two hours before bed\par -Things to avoid including overeating, spicy foods, tight clothing, eating within three hours of bed, this list is not all inclusive. \par -For treatment of reflux, possible options discussed including diet control, H2 blockers, PPIs, as well as coating motility agents discussed as treatment options. Timing of meals and proximity of last meal to sleep were discussed. If symptoms persist, a formal gastrointestinal evaluation is needed.\par \par problem 3: primary snoring\par -recommended to use Oxy Aid\par Good sleep hygiene was encouraged including avoiding watching television an hour before bed, keeping caffeine at a low, avoiding reading, television, or anything, in bed, no drinking any liquids three hours before bedtime, and only getting into bed when tired and ready for sleep. \par \par problem 4: poor sleep\par -f/u with psychiatrist\par -recommended Sleep Guard \par \par problem 5: allergies\par -continue Clarinex 5 mg QHS\par -Environmental measures for allergies were encouraged including mattress and pillow cover, air purifier, and environmental controls.\par \par problem 6: poor breathing mechanics\par -Proper breathing techniques were reviewed with an emphasis of exhalation. Patient instructed to breath in for 1 second and out for four seconds. Patient was encouraged to not talk while walking.\par \par problem 7: obesity - need to improve\par -Weight loss, exercise, and diet control were discussed and are highly encouraged. Treatment options were given such as, aqua therapy, and contacting a nutritionist. Recommended to use the elliptical, stationary bike, less use of treadmill.  Obesity is associated with worsening asthma, shortness of breath, and potential for cardiac disease, diabetes, and other underlying medical conditions.\par \par problem 8: anxiety\par -recommended to see Dr. Gu \par -recommended to read "The Gift of Maybe"\par \par Problem 9: Health Maintenance/COVID19 Precautions:\par - Clean your hands often. Wash your hands often with soap and water for at least 20 seconds, especially after blowing your nose, coughing, or sneezing, or having been in a public place.\par - If soap and water are not available, use a hand  that contains at least 60% alcohol.\par - To the extent possible, avoid touching high-touch surfaces in public places - elevator buttons, door handles, handrails, handshaking with people, etc. Use a tissue or your sleeve to cover your hand or finger if you must touch something.\par - Wash your hands after touching surfaces in public places.\par - Avoid touching your face, nose, eyes, etc.\par - Clean and disinfect your home to remove germs: practice routine cleaning of frequently touched surfaces (for example: tables, doorknobs, light switches, handles, desks, toilets, faucets, sinks & cell phones)\par - Avoid crowds, especially in poorly ventilated spaces. Your risk of exposure to respiratory viruses like COVID-19 may increase in crowded, closed-in settings with little air circulation if there are people in the crowd who are sick. All patients are recommended to practice social distancing and stay at least 6 feet away from others.\par - Avoid all non-essential travel including plane trips, and especially avoid embarking on cruise ships.\par -If COVID-19 is spreading in your community, take extra measures to put distance between yourself and other people to further reduce your risk of being exposed to this new virus.\par -Stay home as much as possible.\par - Consider ways of getting food brought to your house through family, social, or commercial networks\par -Be aware that the virus has been known to live in the air up to 3 hours post exposure, cardboard up to 24 hours post exposure, copper up to 4 hours post exposure, steel and plastic up to 2-3 days post exposure. Risk of transmission from these surfaces are affected by many variables.\par Immune Support Recommendations:\par -OTC Vitamin C 500mg BID \par -OTC Quercetin 250-500mg BID \par -OTC Zinc 75-100mg per day \par -OTC Melatonin 1 or 2 mg a night \par -OTC Vitamin D 1-4000mg per day \par -OTC Tonic Water 8oz per day\par Asthma and COVID19:\par You need to make sure your asthma is under control. This often requires the use of inhaled corticosteroids (and sometimes oral corticosteroids). Inhaled corticosteroids do not likely reduce your immune system’s ability to fight infections, but oral corticosteroids may. It is important to use the steps above to protect yourself to limit your exposure to any respiratory virus.\par \par problem 10: health maintenance\par -mammogram pending (Father Breast CA)\par -recommended Well care and cancer screening in the face of BRCA positive (Hematology evaluation pending) \par -s/p 2019 flu shot (given in office 11.21.19)\par -recommended strep pneumonia vaccines: Prevnar-13 vaccine, followed by Pneumo vaccine 23 on year following\par -recommended early intervention for URIs\par -recommended osteoporosis evaluations\par -recommended early dermatological evaluations\par -recommended after the age of 50 to the age of 70, colonoscopy every 5 years\par \par F/U in 4 months - [facility] \par He is encouraged to call with any changes, concerns, or questions.

## 2020-08-03 NOTE — PROCEDURE
[FreeTextEntry1] : 6 minute walk test reveals a low saturation of 95% with moderate dyspnea or fatigue; walked 586.8 meters.

## 2020-08-03 NOTE — PHYSICAL EXAM
[No Acute Distress] : no acute distress [Normal Oropharynx] : normal oropharynx [Normal Appearance] : normal appearance [Normal Rate/Rhythm] : normal rate/rhythm [No Neck Mass] : no neck mass [Normal S1, S2] : normal s1, s2 [No Murmurs] : no murmurs [No Resp Distress] : no resp distress [Clear to Auscultation Bilaterally] : clear to auscultation bilaterally [Benign] : benign [No Clubbing] : no clubbing [No Abnormalities] : no abnormalities [Normal Gait] : normal gait [No Cyanosis] : no cyanosis [FROM] : FROM [No Edema] : no edema [Oriented x3] : oriented x3 [No Focal Deficits] : no focal deficits [Normal Color/ Pigmentation] : normal color/ pigmentation [Normal Affect] : normal affect [III] : Mallampati Class: III [TextBox_68] : I:E 1:3, clear

## 2020-08-03 NOTE — HISTORY OF PRESENT ILLNESS
[FreeTextEntry1] : Mr. Lombardo is a 54 year old male with a history of abnormal chest CT/PFT, allergic rhinitis, asthma, fibrosis lung, GERD, obesity, poor sleep, primary snoring, and SOB presenting to the office today for a follow up visit. His chief complaint is taking care of his father who was diagnosed with Breast Cancer.\par -his father has recently been diagnosed with Breast Cancer. \par -he states that he has been feeling overwhelmed due to all this. \par -he notes that he has  been helping his father.\par -he has been having trouble breathing. \par -he notes that his sleep is not the best due to having to care of his father. \par -he states that the heat can trigger breathing issues for him. \par -heartburn and reflux is controlled with medication use. \par -has been trying to walk but keeps overheating. \par -he states that one of his medications have been causing him to overheat.\par -he reports that he has been walking as a form of exercise. \par \par -he denies any chest pain, chest pressure, diarrhea, constipation, dysphagia, dizziness, sour taste in the mouth, leg swelling, leg pain, itchy eyes, itchy ears, myalgias or arthralgias.

## 2020-08-03 NOTE — ADDENDUM
[FreeTextEntry1] : Documented by [place] acting as a scribe for Dr. Adam Blandon on 08/03/2020 \par \par All medical record entries made by the Scribe were at my, Dr. Adam Blandon's, direction and personally dictated by me on 08/03/2020 . I have reviewed the chart and agree that the record accurately reflects my personal performance of the history, physical exam, assessment and plan. I have also personally directed, reviewed, and agree with the discharge instructions.

## 2020-08-17 ENCOUNTER — APPOINTMENT (OUTPATIENT)
Dept: PSYCHIATRY | Facility: CLINIC | Age: 55
End: 2020-08-17
Payer: MEDICARE

## 2020-08-17 PROCEDURE — 99214 OFFICE O/P EST MOD 30 MIN: CPT

## 2020-09-14 ENCOUNTER — APPOINTMENT (OUTPATIENT)
Dept: PSYCHIATRY | Facility: CLINIC | Age: 55
End: 2020-09-14

## 2020-10-03 ENCOUNTER — RX RENEWAL (OUTPATIENT)
Age: 55
End: 2020-10-03

## 2020-10-16 ENCOUNTER — APPOINTMENT (OUTPATIENT)
Dept: PSYCHIATRY | Facility: CLINIC | Age: 55
End: 2020-10-16
Payer: MEDICARE

## 2020-10-16 PROCEDURE — 99214 OFFICE O/P EST MOD 30 MIN: CPT

## 2020-10-28 ENCOUNTER — RX RENEWAL (OUTPATIENT)
Age: 55
End: 2020-10-28

## 2020-11-16 ENCOUNTER — APPOINTMENT (OUTPATIENT)
Dept: PSYCHIATRY | Facility: CLINIC | Age: 55
End: 2020-11-16
Payer: MEDICARE

## 2020-11-16 PROCEDURE — 99214 OFFICE O/P EST MOD 30 MIN: CPT

## 2020-11-16 RX ORDER — ALPRAZOLAM 0.25 MG/1
0.25 TABLET ORAL
Qty: 60 | Refills: 0 | Status: DISCONTINUED | COMMUNITY
Start: 2017-04-11 | End: 2020-11-16

## 2020-11-16 RX ORDER — DULOXETINE HYDROCHLORIDE 30 MG/1
30 CAPSULE, DELAYED RELEASE PELLETS ORAL
Qty: 30 | Refills: 0 | Status: COMPLETED | COMMUNITY
Start: 2017-06-08 | End: 2020-11-16

## 2020-11-19 ENCOUNTER — APPOINTMENT (OUTPATIENT)
Dept: PULMONOLOGY | Facility: CLINIC | Age: 55
End: 2020-11-19
Payer: MEDICARE

## 2020-11-19 VITALS
SYSTOLIC BLOOD PRESSURE: 125 MMHG | OXYGEN SATURATION: 98 % | BODY MASS INDEX: 36.37 KG/M2 | HEART RATE: 120 BPM | WEIGHT: 240 LBS | HEIGHT: 68 IN | TEMPERATURE: 97.3 F | RESPIRATION RATE: 16 BRPM | DIASTOLIC BLOOD PRESSURE: 80 MMHG

## 2020-11-19 PROCEDURE — 99214 OFFICE O/P EST MOD 30 MIN: CPT

## 2020-11-19 NOTE — ASSESSMENT
[FreeTextEntry1] : Mr. Lombardo is a 55 year old male who has a history of asthma, allergies, GERD, primary snoring and shortness of breath. He is stable from a pulmonary perspective, aside from some shortness of breath at rest (?posture/ stress/ likely mechanical) - His number one issue is family stress (father's death). \par \par His shortness of breath is multifactorial due to:\par -poor breathing mechanics\par -asthma\par -overweight / out of shape\par -anxiety\par -?CAD\par \par problem 1: asthma -(controlled)\par -continue Anoro at 1 inhalation QD\par -continue Arnuity 200 at 1 inhalation QD\par -continue Singulair 10 mg QHS \par -continue Ventolin 2 puffs QHS\par -Asthma is  believed to be caused by inherited (genetic) and environmental factor, but its exact cause is unknown. Asthma may be triggered by allergens, lung infections, or irritants in the air. Asthma triggers are different for each person\par -Inhaler technique reviewed as well as oral hygiene techniques reviewed with patient. Avoidance of cold air, extremes of temperature, rescue inhaler should be used before exercise. Order of medication reviewed with patient. Recommended use of a cool mist humidifier in the bedroom.\par \par problem 2: GERD, hiatal hernia \par -continue Omeprazole 40 mg before breakfast\par -Rule of 2s: avoid eating too much, eating too late, eating too spicy, eating two hours before bed\par -Things to avoid including overeating, spicy foods, tight clothing, eating within three hours of bed, this list is not all inclusive. \par -For treatment of reflux, possible options discussed including diet control, H2 blockers, PPIs, as well as coating motility agents discussed as treatment options. Timing of meals and proximity of last meal to sleep were discussed. If symptoms persist, a formal gastrointestinal evaluation is needed.\par \par problem 3: primary snoring\par -recommended to use Oxy Aid\par Good sleep hygiene was encouraged including avoiding watching television an hour before bed, keeping caffeine at a low, avoiding reading, television, or anything, in bed, no drinking any liquids three hours before bedtime, and only getting into bed when tired and ready for sleep. \par \par problem 4: poor sleep\par -f/u with psychiatrist\par -recommended Sleep Guard \par \par problem 5: allergies\par -continue Clarinex 5 mg QHS\par -Environmental measures for allergies were encouraged including mattress and pillow cover, air purifier, and environmental controls.\par \par problem 6: poor breathing mechanics\par -Proper breathing techniques were reviewed with an emphasis of exhalation. Patient instructed to breath in for 1 second and out for four seconds. Patient was encouraged to not talk while walking.\par \par problem 7: obesity - need to improve\par -Weight loss, exercise, and diet control were discussed and are highly encouraged. Treatment options were given such as, aqua therapy, and contacting a nutritionist. Recommended to use the elliptical, stationary bike, less use of treadmill.  Obesity is associated with worsening asthma, shortness of breath, and potential for cardiac disease, diabetes, and other underlying medical conditions.\par \par problem 8: anxiety\par -recommended to see Dr. Gu \par -recommended to read "The Gift of Maybe"\par \par Problem 9: Health Maintenance/COVID19 Precautions:\par - Clean your hands often. Wash your hands often with soap and water for at least 20 seconds, especially after blowing your nose, coughing, or sneezing, or having been in a public place.\par - If soap and water are not available, use a hand  that contains at least 60% alcohol.\par - To the extent possible, avoid touching high-touch surfaces in public places - elevator buttons, door handles, handrails, handshaking with people, etc. Use a tissue or your sleeve to cover your hand or finger if you must touch something.\par - Wash your hands after touching surfaces in public places.\par - Avoid touching your face, nose, eyes, etc.\par - Clean and disinfect your home to remove germs: practice routine cleaning of frequently touched surfaces (for example: tables, doorknobs, light switches, handles, desks, toilets, faucets, sinks & cell phones)\par - Avoid crowds, especially in poorly ventilated spaces. Your risk of exposure to respiratory viruses like COVID-19 may increase in crowded, closed-in settings with little air circulation if there are people in the crowd who are sick. All patients are recommended to practice social distancing and stay at least 6 feet away from others.\par - Avoid all non-essential travel including plane trips, and especially avoid embarking on cruise ships.\par -If COVID-19 is spreading in your community, take extra measures to put distance between yourself and other people to further reduce your risk of being exposed to this new virus.\par -Stay home as much as possible.\par - Consider ways of getting food brought to your house through family, social, or commercial networks\par -Be aware that the virus has been known to live in the air up to 3 hours post exposure, cardboard up to 24 hours post exposure, copper up to 4 hours post exposure, steel and plastic up to 2-3 days post exposure. Risk of transmission from these surfaces are affected by many variables.\par Immune Support Recommendations:\par -OTC Vitamin C 500mg BID \par -OTC Quercetin 250-500mg BID \par -OTC Zinc 75-100mg per day \par -OTC Melatonin 1 or 2 mg a night \par -OTC Vitamin D 1-4000mg per day \par -OTC Tonic Water 8oz per day\par Asthma and COVID19:\par You need to make sure your asthma is under control. This often requires the use of inhaled corticosteroids (and sometimes oral corticosteroids). Inhaled corticosteroids do not likely reduce your immune system’s ability to fight infections, but oral corticosteroids may. It is important to use the steps above to protect yourself to limit your exposure to any respiratory virus.\par \par problem 10: health maintenance\par -recommended Well care and cancer screening in the face of BRCA positive (Hematology evaluation pending) \par -s/p 2020 flu shot (administered in office 11/19/2020 ) \par -recommended strep pneumonia vaccines: Prevnar-13 vaccine, followed by Pneumo vaccine 23 on year following\par -recommended early intervention for URIs\par -recommended osteoporosis evaluations\par -recommended early dermatological evaluations\par -recommended after the age of 50 to the age of 70, colonoscopy every 5 years\par \par F/U in 4 months - [facility] \par He is encouraged to call with any changes, concerns, or questions.

## 2020-11-19 NOTE — ADDENDUM
[FreeTextEntry1] : Documented by Jonny Walker acting as a scribe for Dr. Adam Blandon on 11/19/2020.\par \par All medical record entries made by the Scribe were at my, Dr. Adam Blandon's, direction and personally dictated by me on 11/19/2020 . I have reviewed the chart and agree that the record accurately reflects my personal performance of the history, physical exam, assessment and plan. I have also personally directed, reviewed, and agree with the discharge instructions. \par

## 2020-11-19 NOTE — HISTORY OF PRESENT ILLNESS
[FreeTextEntry1] : Mr. Lombardo is a 55 year old male with a history of abnormal chest CT/PFT, allergic rhinitis, asthma, fibrosis lung, GERD, obesity, poor sleep, primary snoring, and SOB presenting to the office today for a follow up visit. His chief complaint is mourning his father's passing.\par \par -he notes in mourning of father's passing\par -he notes generally feeling well\par -he notes energy levels improving\par -he notes disrupted sleep patterns returning to baseline\par -he notes sleeping 5 to 6 hours a night on average\par -he notes waking less fatigued, alleviated by mid-day naps q3days\par -he denies palpitations\par -he notes exposure to mother who has URI and she was treated with Z-pack\par -he denies visual issues\par -he notes exercise limited by cold weather\par \par \par -denies any chest pain, chest pressure, diarrhea, constipation, dysphagia, sour taste in the mouth, dizziness, leg swelling, leg pain, myalgias, arthralgias, itchy eyes, itchy ears, heartburn, or reflux.\par \par

## 2020-12-12 ENCOUNTER — RX RENEWAL (OUTPATIENT)
Age: 55
End: 2020-12-12

## 2020-12-15 ENCOUNTER — APPOINTMENT (OUTPATIENT)
Dept: PSYCHIATRY | Facility: CLINIC | Age: 55
End: 2020-12-15
Payer: MEDICARE

## 2020-12-15 PROCEDURE — 99214 OFFICE O/P EST MOD 30 MIN: CPT

## 2021-01-26 ENCOUNTER — APPOINTMENT (OUTPATIENT)
Dept: PSYCHIATRY | Facility: CLINIC | Age: 56
End: 2021-01-26
Payer: MEDICARE

## 2021-01-26 PROCEDURE — 99214 OFFICE O/P EST MOD 30 MIN: CPT

## 2021-01-26 RX ORDER — FLUOXETINE HYDROCHLORIDE 10 MG/1
10 CAPSULE ORAL
Qty: 90 | Refills: 0 | Status: COMPLETED | COMMUNITY
Start: 2020-12-15 | End: 2021-01-26

## 2021-03-02 DIAGNOSIS — Z01.812 ENCOUNTER FOR PREPROCEDURAL LABORATORY EXAMINATION: ICD-10-CM

## 2021-03-10 ENCOUNTER — APPOINTMENT (OUTPATIENT)
Dept: PSYCHIATRY | Facility: CLINIC | Age: 56
End: 2021-03-10
Payer: MEDICARE

## 2021-03-10 PROCEDURE — 99214 OFFICE O/P EST MOD 30 MIN: CPT

## 2021-03-22 ENCOUNTER — LABORATORY RESULT (OUTPATIENT)
Age: 56
End: 2021-03-22

## 2021-03-26 ENCOUNTER — APPOINTMENT (OUTPATIENT)
Dept: PULMONOLOGY | Facility: CLINIC | Age: 56
End: 2021-03-26
Payer: MEDICARE

## 2021-03-26 ENCOUNTER — NON-APPOINTMENT (OUTPATIENT)
Age: 56
End: 2021-03-26

## 2021-03-26 VITALS
TEMPERATURE: 97.2 F | HEART RATE: 71 BPM | WEIGHT: 237 LBS | RESPIRATION RATE: 16 BRPM | DIASTOLIC BLOOD PRESSURE: 80 MMHG | BODY MASS INDEX: 35.92 KG/M2 | OXYGEN SATURATION: 98 % | HEIGHT: 68 IN | SYSTOLIC BLOOD PRESSURE: 120 MMHG

## 2021-03-26 DIAGNOSIS — M54.9 DORSALGIA, UNSPECIFIED: ICD-10-CM

## 2021-03-26 PROCEDURE — 95012 NITRIC OXIDE EXP GAS DETER: CPT

## 2021-03-26 PROCEDURE — 99214 OFFICE O/P EST MOD 30 MIN: CPT | Mod: 25

## 2021-03-26 PROCEDURE — ZZZZZ: CPT

## 2021-03-26 PROCEDURE — 94010 BREATHING CAPACITY TEST: CPT

## 2021-03-26 PROCEDURE — 94618 PULMONARY STRESS TESTING: CPT

## 2021-03-26 NOTE — ADDENDUM
[FreeTextEntry1] : Documented by Zac Murry acting as a scribe for Dr. Adam Blandon on (03/26/2021).\par \par All medical record entries made by the Scribe were at my, Dr. Adam Blandon's, direction and personally dictated by me on (03/26/2021). I have reviewed the chart and agree that the record accurately reflects my personal performance of the history, physical exam, assessment and plan. I have also personally directed, reviewed, and agree with the discharge instructions.\par  \par

## 2021-03-26 NOTE — HISTORY OF PRESENT ILLNESS
[FreeTextEntry1] : Mr. Lombardo is a 55 year old male with a history of abnormal chest CT/PFT, allergic rhinitis, asthma, fibrosis lung, GERD, obesity, poor sleep, primary snoring, and SOB presenting to the office today for a follow up visit. His chief complaint is mourning his father's passing.\par -He notes feeling SOB \par -He notes sleeping too much and feeling fatigued throughout the day \par - he notes her bowels are regular \par -He denies hoarseness\par -He notes his SOB exacerbated by anxiety and anger \par -He denies palpitations \par -He notes losing weight\par -He notes exercising more frequently, walking for exercise\par -He notes having sinus pain which gave him migraines and dizziness which comes on in the spring\par -He notes taking omeprazole which controls his reflux \par -He notes anxiety due to his home life \par \par - patient denies any headaches, nausea, vomiting, fever, chills, sweats, chest pain, chest pressure, palpitations,coughing, wheezing, fatigue, diarrhea, constipation, dysphagia, myalgias, dizziness, leg swelling, leg pain, itchy eyes, itchy ears, heartburn, reflux or sour taste in the mouth

## 2021-03-26 NOTE — ASSESSMENT
[FreeTextEntry1] : Mr. Lombardo is a 55 year old male who has a history of asthma, allergies, GERD, primary snoring and shortness of breath. He is stable from a pulmonary perspective, aside from some shortness of breath at rest (?posture/ stress/ likely mechanical) - His number one issue is family stress (father's death). (still) -Stress induced SOB \par \par His shortness of breath is multifactorial due to:\par -poor breathing mechanics\par -asthma\par -overweight / out of shape\par -anxiety\par -?CAD\par \par problem 1: asthma -(controlled)\par -continue Anoro at 1 inhalation QD\par -continue Arnuity 200 at 1 inhalation QD\par -continue Singulair 10 mg QHS \par -continue Ventolin 2 puffs QHS\par -Asthma is  believed to be caused by inherited (genetic) and environmental factor, but its exact cause is unknown. Asthma may be triggered by allergens, lung infections, or irritants in the air. Asthma triggers are different for each person\par -Inhaler technique reviewed as well as oral hygiene techniques reviewed with patient. Avoidance of cold air, extremes of temperature, rescue inhaler should be used before exercise. Order of medication reviewed with patient. Recommended use of a cool mist humidifier in the bedroom.\par \par problem 2: GERD, hiatal hernia \par -continue Omeprazole 40 mg before breakfast\par -Rule of 2s: avoid eating too much, eating too late, eating too spicy, eating two hours before bed\par -Things to avoid including overeating, spicy foods, tight clothing, eating within three hours of bed, this list is not all inclusive. \par -For treatment of reflux, possible options discussed including diet control, H2 blockers, PPIs, as well as coating motility agents discussed as treatment options. Timing of meals and proximity of last meal to sleep were discussed. If symptoms persist, a formal gastrointestinal evaluation is needed.\par \par problem 3: primary snoring\par -recommended to use Oxy Aid, "Slumber Bump" and "Somnifix" \par Good sleep hygiene was encouraged including avoiding watching television an hour before bed, keeping caffeine at a low, avoiding reading, television, or anything, in bed, no drinking any liquids three hours before bedtime, and only getting into bed when tired and ready for sleep. \par \par problem 4: poor sleep\par -f/u with psychiatrist\par -recommended Sleep Guard \par \par problem 5: allergies\par -continue Clarinex 5 mg QHS\par -Environmental measures for allergies were encouraged including mattress and pillow cover, air purifier, and environmental controls.\par \par problem 6: poor breathing mechanics (Wimhof / Buteyko)\par -recommended Calm Harry\par -Proper breathing techniques were reviewed with an emphasis of exhalation. Patient instructed to breath in for 1 second and out for four seconds. Patient was encouraged to not talk while walking.\par \par problem 7: obesity - need to improve\par -Weight loss, exercise, and diet control were discussed and are highly encouraged. Treatment options were given such as, aqua therapy, and contacting a nutritionist. Recommended to use the elliptical, stationary bike, less use of treadmill.  Obesity is associated with worsening asthma, shortness of breath, and potential for cardiac disease, diabetes, and other underlying medical conditions.\par \par problem 8: anxiety\par -recommended to see Dr. Gu \par -recommended to read "The Gift of Maybe"\par \par Problem 9: Health Maintenance/COVID19 Precautions:\par -Vaccine pending \par - Clean your hands often. Wash your hands often with soap and water for at least 20 seconds, especially after blowing your nose, coughing, or sneezing, or having been in a public place.\par - If soap and water are not available, use a hand  that contains at least 60% alcohol.\par - To the extent possible, avoid touching high-touch surfaces in public places - elevator buttons, door handles, handrails, handshaking with people, etc. Use a tissue or your sleeve to cover your hand or finger if you must touch something.\par - Wash your hands after touching surfaces in public places.\par - Avoid touching your face, nose, eyes, etc.\par - Clean and disinfect your home to remove germs: practice routine cleaning of frequently touched surfaces (for example: tables, doorknobs, light switches, handles, desks, toilets, faucets, sinks & cell phones)\par - Avoid crowds, especially in poorly ventilated spaces. Your risk of exposure to respiratory viruses like COVID-19 may increase in crowded, closed-in settings with little air circulation if there are people in the crowd who are sick. All patients are recommended to practice social distancing and stay at least 6 feet away from others.\par - Avoid all non-essential travel including plane trips, and especially avoid embarking on cruise ships.\par -If COVID-19 is spreading in your community, take extra measures to put distance between yourself and other people to further reduce your risk of being exposed to this new virus.\par -Stay home as much as possible.\par - Consider ways of getting food brought to your house through family, social, or commercial networks\par -Be aware that the virus has been known to live in the air up to 3 hours post exposure, cardboard up to 24 hours post exposure, copper up to 4 hours post exposure, steel and plastic up to 2-3 days post exposure. Risk of transmission from these surfaces are affected by many variables.\par Immune Support Recommendations:\par -OTC Vitamin C 500mg BID \par -OTC Quercetin 250-500mg BID \par -OTC Zinc 75-100mg per day \par -OTC Melatonin 1 or 2 mg a night \par -OTC Vitamin D 1-4000mg per day \par -OTC Tonic Water 8oz per day\par Asthma and COVID19:\par You need to make sure your asthma is under control. This often requires the use of inhaled corticosteroids (and sometimes oral corticosteroids). Inhaled corticosteroids do not likely reduce your immune system’s ability to fight infections, but oral corticosteroids may. It is important to use the steps above to protect yourself to limit your exposure to any respiratory virus.\par \par problem 10: health maintenance\par -Back pain \par -recommended Well care and cancer screening in the face of BRCA positive (Hematology evaluation pending) \par -s/p 2020 flu shot (administered in office 11/19/2020 ) \par -recommended strep pneumonia vaccines: Prevnar-13 vaccine, followed by Pneumo vaccine 23 on year following\par -recommended early intervention for URIs\par -recommended osteoporosis evaluations\par -recommended early dermatological evaluations\par -recommended after the age of 50 to the age of 70, colonoscopy every 5 years\par \par F/U in 4 months - SPI \par He is encouraged to call with any changes, concerns, or questions.

## 2021-03-30 ENCOUNTER — RX RENEWAL (OUTPATIENT)
Age: 56
End: 2021-03-30

## 2021-04-01 ENCOUNTER — RX RENEWAL (OUTPATIENT)
Age: 56
End: 2021-04-01

## 2021-04-08 ENCOUNTER — RX RENEWAL (OUTPATIENT)
Age: 56
End: 2021-04-08

## 2021-04-10 ENCOUNTER — RX RENEWAL (OUTPATIENT)
Age: 56
End: 2021-04-10

## 2021-04-14 ENCOUNTER — APPOINTMENT (OUTPATIENT)
Dept: PSYCHIATRY | Facility: CLINIC | Age: 56
End: 2021-04-14

## 2021-05-12 ENCOUNTER — APPOINTMENT (OUTPATIENT)
Dept: PSYCHIATRY | Facility: CLINIC | Age: 56
End: 2021-05-12
Payer: MEDICARE

## 2021-05-12 PROCEDURE — 99214 OFFICE O/P EST MOD 30 MIN: CPT

## 2021-07-21 ENCOUNTER — APPOINTMENT (OUTPATIENT)
Dept: PSYCHIATRY | Facility: CLINIC | Age: 56
End: 2021-07-21
Payer: MEDICARE

## 2021-07-21 PROCEDURE — 99214 OFFICE O/P EST MOD 30 MIN: CPT

## 2021-07-30 ENCOUNTER — APPOINTMENT (OUTPATIENT)
Dept: PULMONOLOGY | Facility: CLINIC | Age: 56
End: 2021-07-30

## 2021-09-20 ENCOUNTER — NON-APPOINTMENT (OUTPATIENT)
Age: 56
End: 2021-09-20

## 2021-09-20 ENCOUNTER — APPOINTMENT (OUTPATIENT)
Dept: ORTHOPEDIC SURGERY | Facility: CLINIC | Age: 56
End: 2021-09-20
Payer: MEDICARE

## 2021-09-20 VITALS — BODY MASS INDEX: 37.44 KG/M2 | HEIGHT: 68 IN | WEIGHT: 247 LBS

## 2021-09-20 DIAGNOSIS — M77.12 LATERAL EPICONDYLITIS, LEFT ELBOW: ICD-10-CM

## 2021-09-20 DIAGNOSIS — M77.01 MEDIAL EPICONDYLITIS, RIGHT ELBOW: ICD-10-CM

## 2021-09-20 PROCEDURE — 99204 OFFICE O/P NEW MOD 45 MIN: CPT

## 2021-09-20 RX ORDER — DICLOFENAC SODIUM TOPICAL GEL, 1% 10 MG/G
1 GEL TOPICAL
Qty: 1 | Refills: 0 | Status: ACTIVE | COMMUNITY
Start: 2021-09-20 | End: 1900-01-01

## 2021-09-24 ENCOUNTER — APPOINTMENT (OUTPATIENT)
Dept: PULMONOLOGY | Facility: CLINIC | Age: 56
End: 2021-09-24
Payer: MEDICARE

## 2021-09-24 VITALS
BODY MASS INDEX: 33.77 KG/M2 | WEIGHT: 228 LBS | SYSTOLIC BLOOD PRESSURE: 116 MMHG | TEMPERATURE: 96 F | OXYGEN SATURATION: 98 % | HEIGHT: 69 IN | RESPIRATION RATE: 16 BRPM | DIASTOLIC BLOOD PRESSURE: 76 MMHG | HEART RATE: 90 BPM

## 2021-09-24 PROCEDURE — ZZZZZ: CPT

## 2021-09-24 PROCEDURE — 99214 OFFICE O/P EST MOD 30 MIN: CPT

## 2021-09-24 RX ORDER — NYSTATIN 100000 [USP'U]/ML
100000 SUSPENSION ORAL
Qty: 200 | Refills: 0 | Status: ACTIVE | COMMUNITY
Start: 2021-09-24 | End: 1900-01-01

## 2021-09-24 NOTE — ASSESSMENT
[FreeTextEntry1] : Mr. Lombardo is a 55 year old male with a history of abnormal chest CT/PFT, allergic rhinitis, asthma, fibrosis lung, GERD, obesity, poor sleep, primary snoring, and SOB presenting to the office today for routine follow up. He does not want to go get covid tested and come back for spirometry. \par \par problem 1: asthma -(controlled)\par -continue Anoro at 1 inhalation QD rinse and gargle\par -continue Arnuity 200 at 1 inhalation QD rinse and gargle\par -continue Singulair 10 mg QHS \par -continue Ventolin 2 puffs in am prior to inhaler\par -Asthma is  believed to be caused by inherited (genetic) and environmental factor, but its exact cause is unknown. Asthma may be triggered by allergens, lung infections, or irritants in the air. Asthma triggers are different for each person\par -Inhaler technique reviewed as well as oral hygiene techniques reviewed with patient. Avoidance of cold air, extremes of temperature, rescue inhaler should be used before exercise. Order of medication reviewed with patient. Recommended use of a cool mist humidifier in the bedroom.\par \par problem 2: GERD, hiatal hernia \par -continue Omeprazole 40 mg before breakfast\par -Rule of 2s: avoid eating too much, eating too late, eating too spicy, eating two hours before bed\par -Things to avoid including overeating, spicy foods, tight clothing, eating within three hours of bed, this list is not all inclusive. \par -For treatment of reflux, possible options discussed including diet control, H2 blockers, PPIs, as well as coating motility agents discussed as treatment options. Timing of meals and proximity of last meal to sleep were discussed. If symptoms persist, a formal gastrointestinal evaluation is needed.\par \par problem 3: primary snoring\par -recommended to use Oxy Aid, "Slumber Bump" and "Somnifix" \par \par problem 4: poor sleep\par -f/u with psychiatrist\par -recommended Sleep Guard \par \par problem 5: allergies-stable\par -no longer on clarinex\par -Environmental measures for allergies were encouraged including mattress and pillow cover, air purifier, and environmental controls.\par \par problem 6: anxiety and related SOB\par -recommended to see Dr. Gu \par -recommended to read "The Gift of Maybe"\par \par Problem 7: oral thrush\par -add nystatin swish and swallow 10 ml BID x 7-10 days\par \par F/U as scheduled with Dr. Blandon\par He is encouraged to call with any changes, concerns, or questions.

## 2021-09-24 NOTE — PHYSICAL EXAM
[No Acute Distress] : no acute distress [III] : Mallampati Class: III [No Neck Mass] : no neck mass [Normal Rate/Rhythm] : normal rate/rhythm [Normal S1, S2] : normal s1, s2 [No Murmurs] : no murmurs [No Resp Distress] : no resp distress [Clear to Auscultation Bilaterally] : clear to auscultation bilaterally [No Abnormalities] : no abnormalities [Benign] : benign [Normal Gait] : normal gait [No Clubbing] : no clubbing [No Cyanosis] : no cyanosis [No Edema] : no edema [FROM] : FROM [Normal Color/ Pigmentation] : normal color/ pigmentation [No Focal Deficits] : no focal deficits [Oriented x3] : oriented x3 [Normal Affect] : normal affect [Well Nourished] : well nourished [Normal Appearance] : normal appearance [Well Groomed] : well groomed [No Deformities] : no deformities [Well Developed] : well developed [TextBox_11] : oral thrush [TextBox_68] : I:E 1:3, clear

## 2021-09-24 NOTE — HISTORY OF PRESENT ILLNESS
[FreeTextEntry1] : Mr. Lombardo is a 55 year old male with a history of abnormal chest CT/PFT, allergic rhinitis, asthma, fibrosis lung, GERD, obesity, poor sleep, primary snoring, and SOB presenting to the office today for clearance for a MRI that he plans to be sedated for. \par He was told he would need pulmonary clearance. \par He needs a neck and shoulder MRI and would like to be "put out" for it. Neuro advised him that he would need pulm clearance. He is not booked for breathing and his COVID19 negative test is not accepted via [facility]. \par Pt was given the option to go get a [facility] urgent care rapid test and come back for SPI- he is declining. \par He plans to just postpone the MRI or ask for oral anti anxiety meds to help. \par He feels his pulmonary status is stable. \par He is compliant on his pulmonary regimen.\par He has been dealing with tendonitis in the elbows and there is concern that he may have a pinched nerve near his neck.\par No other complaints. \par Has not needed his rescue inhaler aside from Dr. Morales recommendation to use Albuterol prior to his maintenance inhaler in the morning. \par -He notes feeling SOB \par -He notes sleeping too much and feeling fatigued throughout the day \par - he notes her bowels are regular \par -He denies hoarseness\par -He notes his SOB exacerbated by anxiety and anger \par -He denies palpitations \par -He notes losing weight\par -He notes exercising more frequently, walking for exercise\par -He notes having sinus pain which gave him migraines and dizziness which comes on in the spring\par -He notes taking omeprazole which controls his reflux \par -He notes anxiety due to his home life \par \par - patient denies any headaches, nausea, vomiting, fever, chills, sweats, chest pain, chest pressure, palpitations,coughing, wheezing, fatigue, diarrhea, constipation, dysphagia, myalgias, dizziness, leg swelling, leg pain, itchy eyes, itchy ears, heartburn, reflux or sour taste in the mouth

## 2021-09-27 ENCOUNTER — APPOINTMENT (OUTPATIENT)
Dept: PSYCHIATRY | Facility: CLINIC | Age: 56
End: 2021-09-27

## 2021-10-07 ENCOUNTER — RX RENEWAL (OUTPATIENT)
Age: 56
End: 2021-10-07

## 2021-10-10 ENCOUNTER — RX RENEWAL (OUTPATIENT)
Age: 56
End: 2021-10-10

## 2021-10-10 RX ORDER — UMECLIDINIUM BROMIDE AND VILANTEROL TRIFENATATE 62.5; 25 UG/1; UG/1
62.5-25 POWDER RESPIRATORY (INHALATION) DAILY
Qty: 180 | Refills: 1 | Status: ACTIVE | COMMUNITY
Start: 2018-03-06 | End: 1900-01-01

## 2021-10-10 RX ORDER — ALBUTEROL SULFATE 90 UG/1
108 (90 BASE) INHALANT RESPIRATORY (INHALATION) EVERY 6 HOURS
Qty: 3 | Refills: 1 | Status: ACTIVE | COMMUNITY
Start: 2020-10-28 | End: 1900-01-01

## 2021-10-11 ENCOUNTER — APPOINTMENT (OUTPATIENT)
Dept: PSYCHIATRY | Facility: CLINIC | Age: 56
End: 2021-10-11
Payer: MEDICARE

## 2021-10-11 PROCEDURE — 99214 OFFICE O/P EST MOD 30 MIN: CPT

## 2021-10-11 RX ORDER — LORATADINE 10 MG/1
10 TABLET ORAL
Qty: 30 | Refills: 0 | Status: DISCONTINUED | COMMUNITY
Start: 2021-08-11

## 2021-10-11 RX ORDER — TRIAMCINOLONE ACETONIDE 1 MG/G
0.1 CREAM TOPICAL
Qty: 80 | Refills: 0 | Status: DISCONTINUED | COMMUNITY
Start: 2021-08-11

## 2021-10-20 ENCOUNTER — APPOINTMENT (OUTPATIENT)
Dept: ORTHOPEDIC SURGERY | Facility: CLINIC | Age: 56
End: 2021-10-20

## 2021-12-09 ENCOUNTER — APPOINTMENT (OUTPATIENT)
Dept: PULMONOLOGY | Facility: CLINIC | Age: 56
End: 2021-12-09

## 2021-12-11 ENCOUNTER — RX RENEWAL (OUTPATIENT)
Age: 56
End: 2021-12-11

## 2021-12-13 ENCOUNTER — APPOINTMENT (OUTPATIENT)
Dept: PSYCHIATRY | Facility: CLINIC | Age: 56
End: 2021-12-13
Payer: MEDICARE

## 2021-12-13 PROCEDURE — 99214 OFFICE O/P EST MOD 30 MIN: CPT

## 2022-03-07 ENCOUNTER — APPOINTMENT (OUTPATIENT)
Dept: PSYCHIATRY | Facility: CLINIC | Age: 57
End: 2022-03-07
Payer: MEDICARE

## 2022-03-07 PROCEDURE — 99214 OFFICE O/P EST MOD 30 MIN: CPT

## 2022-03-07 RX ORDER — KETOCONAZOLE 20 MG/G
2 CREAM TOPICAL
Qty: 60 | Refills: 0 | Status: DISCONTINUED | COMMUNITY
Start: 2022-01-16

## 2022-03-08 ENCOUNTER — NON-APPOINTMENT (OUTPATIENT)
Age: 57
End: 2022-03-08

## 2022-03-08 ENCOUNTER — APPOINTMENT (OUTPATIENT)
Dept: PULMONOLOGY | Facility: CLINIC | Age: 57
End: 2022-03-08
Payer: MEDICARE

## 2022-03-08 VITALS
RESPIRATION RATE: 16 BRPM | SYSTOLIC BLOOD PRESSURE: 110 MMHG | TEMPERATURE: 97.5 F | HEART RATE: 71 BPM | HEIGHT: 69 IN | OXYGEN SATURATION: 97 % | WEIGHT: 224 LBS | DIASTOLIC BLOOD PRESSURE: 60 MMHG | BODY MASS INDEX: 33.18 KG/M2

## 2022-03-08 DIAGNOSIS — Z71.89 OTHER SPECIFIED COUNSELING: ICD-10-CM

## 2022-03-08 PROCEDURE — 94010 BREATHING CAPACITY TEST: CPT

## 2022-03-08 PROCEDURE — 95012 NITRIC OXIDE EXP GAS DETER: CPT

## 2022-03-08 PROCEDURE — 99214 OFFICE O/P EST MOD 30 MIN: CPT | Mod: 25

## 2022-03-08 RX ORDER — UMECLIDINIUM BROMIDE AND VILANTEROL TRIFENATATE 62.5; 25 UG/1; UG/1
62.5-25 POWDER RESPIRATORY (INHALATION)
Qty: 3 | Refills: 1 | Status: ACTIVE | COMMUNITY
Start: 2017-10-31 | End: 1900-01-01

## 2022-03-08 NOTE — HISTORY OF PRESENT ILLNESS
[FreeTextEntry1] : Mr. Lombardo is a 56 year old male with a history of abnormal chest CT/PFT, allergic rhinitis, asthma, fibrosis lung, GERD, obesity, poor sleep, primary snoring, and SOB presenting to the office today for a follow up visit. His chief complaint is \par -he notes he has been feeling okay in general\par -he notes some chest pain every once in a while but it is not sharp\par -he notes some left sided pain near the ribs depending on what he eats\par -he denies any visual issues \par -he notes his sense of smell and taste are normal \par -he notes he exercises more than he usually does through walking a lot\par -he notes his bowels are regular\par -he notes he is sleeping better and gets 6 hours most of the time\par -he notes he lost a little bit of weight\par -he notes his back is good now\par -s/p COVID-19 vaccine x1 (Moderna)\par \par -patient denies any headaches, nausea, vomiting, fever, chills, sweats, chest pain, chest pressure, palpitations, coughing, wheezing, fatigue, diarrhea, constipation, dysphagia, myalgias, dizziness, leg swelling, leg pain, itchy eyes, itchy ears, heartburn, reflux or sour taste in the mouth

## 2022-03-08 NOTE — ASSESSMENT
[FreeTextEntry1] : Mr. Lombardo is a 56 year old male who has a history of asthma, allergies, GERD, primary snoring and shortness of breath. He is stable from a pulmonary perspective, aside from some shortness of breath at rest (?posture/ stress/ likely mechanical) - His number one issue is family stress (father's death). (still) -Stress induced SOB (life better) \par \par His shortness of breath is multifactorial due to:\par -poor breathing mechanics\par -asthma\par -overweight / out of shape\par -anxiety\par -?CAD\par \par problem 1: asthma -(controlled)\par -continue Anoro at 1 inhalation QD\par -continue Arnuity 200 at 1 inhalation QD\par -continue Singulair 10 mg QHS \par -continue Ventolin 2 puffs QHS\par -Asthma is  believed to be caused by inherited (genetic) and environmental factor, but its exact cause is unknown. Asthma may be triggered by allergens, lung infections, or irritants in the air. Asthma triggers are different for each person\par -Inhaler technique reviewed as well as oral hygiene techniques reviewed with patient. Avoidance of cold air, extremes of temperature, rescue inhaler should be used before exercise. Order of medication reviewed with patient. Recommended use of a cool mist humidifier in the bedroom.\par \par problem 2: GERD, hiatal hernia (stable)\par -continue Omeprazole 40 mg before breakfast\par -Rule of 2s: avoid eating too much, eating too late, eating too spicy, eating two hours before bed\par -Things to avoid including overeating, spicy foods, tight clothing, eating within three hours of bed, this list is not all inclusive. \par -For treatment of reflux, possible options discussed including diet control, H2 blockers, PPIs, as well as coating motility agents discussed as treatment options. Timing of meals and proximity of last meal to sleep were discussed. If symptoms persist, a formal gastrointestinal evaluation is needed.\par \par problem 3: primary snoring\par -recommended to use Oxy Aid, "Slumber Bump" and "Somnifix" \par Good sleep hygiene was encouraged including avoiding watching television an hour before bed, keeping caffeine at a low, avoiding reading, television, or anything, in bed, no drinking any liquids three hours before bedtime, and only getting into bed when tired and ready for sleep. \par \par problem 4: poor sleep\par -f/u with psychiatrist\par -recommended Sleep Guard \par \par problem 5: allergies\par -continue Clarinex 5 mg QHS\par -Environmental measures for allergies were encouraged including mattress and pillow cover, air purifier, and environmental controls.\par \par problem 6: poor breathing mechanics (Wim Hof / Buteyko)\par -recommended Calm Harry\par -Proper breathing techniques were reviewed with an emphasis of exhalation. Patient instructed to breath in for 1 second and out for four seconds. Patient was encouraged to not talk while walking.\par \par problem 7: obesity - need to improve\par -Weight loss, exercise, and diet control were discussed and are highly encouraged. Treatment options were given such as, aqua therapy, and contacting a nutritionist. Recommended to use the elliptical, stationary bike, less use of treadmill.  Obesity is associated with worsening asthma, shortness of breath, and potential for cardiac disease, diabetes, and other underlying medical conditions.\par \par problem 8: anxiety\par -recommended to see Dr. Gu \par -recommended to read "The Gift of Maybe"\par \par Problem 9: Health Maintenance/COVID19 Precautions:\par -S/p Covid 19 vaccine (Moderna) x1\par - Clean your hands often. Wash your hands often with soap and water for at least 20 seconds, especially after blowing your nose, coughing, or sneezing, or having been in a public place.\par - If soap and water are not available, use a hand  that contains at least 60% alcohol.\par - To the extent possible, avoid touching high-touch surfaces in public places - elevator buttons, door handles, handrails, handshaking with people, etc. Use a tissue or your sleeve to cover your hand or finger if you must touch something.\par - Wash your hands after touching surfaces in public places.\par - Avoid touching your face, nose, eyes, etc.\par - Clean and disinfect your home to remove germs: practice routine cleaning of frequently touched surfaces (for example: tables, doorknobs, light switches, handles, desks, toilets, faucets, sinks & cell phones)\par - Avoid crowds, especially in poorly ventilated spaces. Your risk of exposure to respiratory viruses like COVID-19 may increase in crowded, closed-in settings with little air circulation if there are people in the crowd who are sick. All patients are recommended to practice social distancing and stay at least 6 feet away from others.\par - Avoid all non-essential travel including plane trips, and especially avoid embarking on cruise ships.\par -If COVID-19 is spreading in your community, take extra measures to put distance between yourself and other people to further reduce your risk of being exposed to this new virus.\par -Stay home as much as possible.\par - Consider ways of getting food brought to your house through family, social, or commercial networks\par -Be aware that the virus has been known to live in the air up to 3 hours post exposure, cardboard up to 24 hours post exposure, copper up to 4 hours post exposure, steel and plastic up to 2-3 days post exposure. Risk of transmission from these surfaces are affected by many variables.\par Immune Support Recommendations:\par -OTC Vitamin C 500mg BID \par -OTC Quercetin 250-500mg BID \par -OTC Zinc 75-100mg per day \par -OTC Melatonin 1 or 2 mg a night \par -OTC Vitamin D 1-4000mg per day \par -OTC Tonic Water 8oz per day\par Asthma and COVID19:\par You need to make sure your asthma is under control. This often requires the use of inhaled corticosteroids (and sometimes oral corticosteroids). Inhaled corticosteroids do not likely reduce your immune system’s ability to fight infections, but oral corticosteroids may. It is important to use the steps above to protect yourself to limit your exposure to any respiratory virus.\par \par problem 10: health maintenance\par -recommended Well care and cancer screening in the face of BRCA positive (Hematology evaluation pending) \par -s/p 2021 flu shot\par -recommended strep pneumonia vaccines: Prevnar-13 vaccine, followed by Pneumo vaccine 23 on year following\par -recommended early intervention for URIs\par -recommended osteoporosis evaluations\par -recommended early dermatological evaluations\par -recommended after the age of 50 to the age of 70, colonoscopy every 5 years\par \par F/U in 4 months - SPI \par He is encouraged to call with any changes, concerns, or questions.

## 2022-03-08 NOTE — ADDENDUM
[FreeTextEntry1] : Documented by Luis Alfredo Best acting as a scribe for Dr. Adam Blandon on 03/08/2022\par \par All medical record entries made by the scribe were at my, Dr. Adam Blandon's, direction and personally dictated by me on 03/08/2022. I have reviewed the chart and agree that the record accurately reflects my personal performance of the history, physical exam, assessment, and plan. I have also personally directed, reviewed, and agree with the discharge instructions.

## 2022-03-08 NOTE — PROCEDURE
[FreeTextEntry1] : PFT revealed normal flows, with a FEV1 of 2.90L, which is 83% of predicted, with a flattened inspiratory limb\par \par FENO was 9; a normal value being less than 25. Fractional exhaled nitric oxide (FENO) is regarded as a simple, noninvasive method for assessing eosinophilic airway inflammation. Produced by a variety of cells within the lung, nitric oxide (NO) concentrations are generally low in healthy individuals. However, high concentrations of NO appear to be involved in nonspecific host defense mechanisms and chronic inflammatory  diseases such as asthma. The American Thoracic Society (ATS) therefore recommended using FENO to aid in the diagnosis and monitoring of eosinophilic airway inflammation and asthma, and for identifying steroid responsive individuals whose chronic respiratory symptoms may be caused by airway inflammation

## 2022-03-08 NOTE — PHYSICAL EXAM
[No Acute Distress] : no acute distress [Normal Oropharynx] : normal oropharynx [III] : Mallampati Class: III [Normal Appearance] : normal appearance [No Neck Mass] : no neck mass [Normal Rate/Rhythm] : normal rate/rhythm [Normal S1, S2] : normal s1, s2 [No Murmurs] : no murmurs [No Resp Distress] : no resp distress [Clear to Auscultation Bilaterally] : clear to auscultation bilaterally [Benign] : benign [No Abnormalities] : no abnormalities [Normal Gait] : normal gait [No Clubbing] : no clubbing [No Cyanosis] : no cyanosis [FROM] : FROM [No Edema] : no edema [Normal Color/ Pigmentation] : normal color/ pigmentation [No Focal Deficits] : no focal deficits [Oriented x3] : oriented x3 [Normal Affect] : normal affect [TextBox_2] : OW [TextBox_68] : I:E 1:3, clear

## 2022-06-06 ENCOUNTER — APPOINTMENT (OUTPATIENT)
Dept: PSYCHIATRY | Facility: CLINIC | Age: 57
End: 2022-06-06
Payer: MEDICARE

## 2022-06-06 PROCEDURE — 99214 OFFICE O/P EST MOD 30 MIN: CPT

## 2022-06-06 RX ORDER — DOXYCYCLINE HYCLATE 100 MG/1
100 TABLET ORAL
Qty: 7 | Refills: 0 | Status: DISCONTINUED | COMMUNITY
Start: 2022-04-22

## 2022-06-10 ENCOUNTER — RX RENEWAL (OUTPATIENT)
Age: 57
End: 2022-06-10

## 2022-09-07 ENCOUNTER — APPOINTMENT (OUTPATIENT)
Dept: PSYCHIATRY | Facility: CLINIC | Age: 57
End: 2022-09-07

## 2022-09-13 ENCOUNTER — NON-APPOINTMENT (OUTPATIENT)
Age: 57
End: 2022-09-13

## 2022-09-13 ENCOUNTER — APPOINTMENT (OUTPATIENT)
Dept: PULMONOLOGY | Facility: CLINIC | Age: 57
End: 2022-09-13

## 2022-09-13 VITALS
BODY MASS INDEX: 32.42 KG/M2 | HEIGHT: 70.5 IN | DIASTOLIC BLOOD PRESSURE: 76 MMHG | TEMPERATURE: 97.6 F | SYSTOLIC BLOOD PRESSURE: 130 MMHG | WEIGHT: 229 LBS | HEART RATE: 85 BPM | OXYGEN SATURATION: 98 % | RESPIRATION RATE: 16 BRPM

## 2022-09-13 DIAGNOSIS — U07.1 COVID-19: ICD-10-CM

## 2022-09-13 PROCEDURE — 95012 NITRIC OXIDE EXP GAS DETER: CPT

## 2022-09-13 PROCEDURE — 94010 BREATHING CAPACITY TEST: CPT

## 2022-09-13 PROCEDURE — 99214 OFFICE O/P EST MOD 30 MIN: CPT | Mod: CS,25

## 2022-09-13 NOTE — ASSESSMENT
[FreeTextEntry1] : Mr. Lombardo is a 56 year old male who has a history of asthma, allergies, GERD, primary snoring and shortness of breath. He is stable from a pulmonary perspective, aside from some shortness of breath at rest (?posture/ stress/ likely mechanical) - His number one issue is family stress (father's death). (still) -Stress induced SOB (life better); s/p COVID-19 7/2022- resolved\par \par His shortness of breath is multifactorial due to:\par -poor breathing mechanics\par -asthma\par -overweight / out of shape\par -anxiety\par -?CAD\par \par problem 1: asthma -(controlled)\par -continue Anoro at 1 inhalation QD\par -continue Arnuity 200 at 1 inhalation QD\par -continue Singulair 10 mg QHS \par -continue Ventolin 2 puffs QHS\par -Asthma is  believed to be caused by inherited (genetic) and environmental factor, but its exact cause is unknown. Asthma may be triggered by allergens, lung infections, or irritants in the air. Asthma triggers are different for each person\par -Inhaler technique reviewed as well as oral hygiene techniques reviewed with patient. Avoidance of cold air, extremes of temperature, rescue inhaler should be used before exercise. Order of medication reviewed with patient. Recommended use of a cool mist humidifier in the bedroom.\par \par problem 2: GERD, hiatal hernia (stable)\par -continue Omeprazole 40 mg before breakfast\par -Rule of 2s: avoid eating too much, eating too late, eating too spicy, eating two hours before bed\par -Things to avoid including overeating, spicy foods, tight clothing, eating within three hours of bed, this list is not all inclusive. \par -For treatment of reflux, possible options discussed including diet control, H2 blockers, PPIs, as well as coating motility agents discussed as treatment options. Timing of meals and proximity of last meal to sleep were discussed. If symptoms persist, a formal gastrointestinal evaluation is needed.\par \par problem 3: primary snoring\par -recommended to use Oxy Aid, "Slumber Bump" and "Somnifix" \par Good sleep hygiene was encouraged including avoiding watching television an hour before bed, keeping caffeine at a low, avoiding reading, television, or anything, in bed, no drinking any liquids three hours before bedtime, and only getting into bed when tired and ready for sleep. \par \par problem 4: poor sleep\par -f/u with psychiatrist\par -recommended Sleep Guard \par \par problem 5: allergies\par -continue Clarinex 5 mg QHS\par -Environmental measures for allergies were encouraged including mattress and pillow cover, air purifier, and environmental controls.\par \par problem 6: poor breathing mechanics (Wim Hof / Samanthako)\par -recommended Calm Harry\par -Proper breathing techniques were reviewed with an emphasis of exhalation. Patient instructed to breath in for 1 second and out for four seconds. Patient was encouraged to not talk while walking.\par \par problem 7: obesity - need to improve\par -Weight loss, exercise, and diet control were discussed and are highly encouraged. Treatment options were given such as, aqua therapy, and contacting a nutritionist. Recommended to use the elliptical, stationary bike, less use of treadmill.  Obesity is associated with worsening asthma, shortness of breath, and potential for cardiac disease, diabetes, and other underlying medical conditions.\par \par problem 8: anxiety\par -recommended to see Dr. Gu \par -recommended to read "The Gift of Maybe"\par \par Problem 9: Health Maintenance/COVID19 Precautions:\par -s/p COVID-19 7/2022\par -S/p Covid 19 vaccine (Moderna) x2\par - Clean your hands often. Wash your hands often with soap and water for at least 20 seconds, especially after blowing your nose, coughing, or sneezing, or having been in a public place.\par - If soap and water are not available, use a hand  that contains at least 60% alcohol.\par - To the extent possible, avoid touching high-touch surfaces in public places - elevator buttons, door handles, handrails, handshaking with people, etc. Use a tissue or your sleeve to cover your hand or finger if you must touch something.\par - Wash your hands after touching surfaces in public places.\par - Avoid touching your face, nose, eyes, etc.\par - Clean and disinfect your home to remove germs: practice routine cleaning of frequently touched surfaces (for example: tables, doorknobs, light switches, handles, desks, toilets, faucets, sinks & cell phones)\par - Avoid crowds, especially in poorly ventilated spaces. Your risk of exposure to respiratory viruses like COVID-19 may increase in crowded, closed-in settings with little air circulation if there are people in the crowd who are sick. All patients are recommended to practice social distancing and stay at least 6 feet away from others.\par - Avoid all non-essential travel including plane trips, and especially avoid embarking on cruise ships.\par -If COVID-19 is spreading in your community, take extra measures to put distance between yourself and other people to further reduce your risk of being exposed to this new virus.\par -Stay home as much as possible.\par - Consider ways of getting food brought to your house through family, social, or commercial networks\par -Be aware that the virus has been known to live in the air up to 3 hours post exposure, cardboard up to 24 hours post exposure, copper up to 4 hours post exposure, steel and plastic up to 2-3 days post exposure. Risk of transmission from these surfaces are affected by many variables.\par Immune Support Recommendations:\par -OTC Vitamin C 500mg BID \par -OTC Quercetin 250-500mg BID \par -OTC Zinc 75-100mg per day \par -OTC Melatonin 1 or 2 mg a night \par -OTC Vitamin D 1-4000mg per day \par -OTC Tonic Water 8oz per day\par Asthma and COVID19:\par You need to make sure your asthma is under control. This often requires the use of inhaled corticosteroids (and sometimes oral corticosteroids). Inhaled corticosteroids do not likely reduce your immune system’s ability to fight infections, but oral corticosteroids may. It is important to use the steps above to protect yourself to limit your exposure to any respiratory virus.\par \par problem 10: health maintenance\par -recommended Well care and cancer screening in the face of BRCA positive (Hematology evaluation pending) \par -s/p 2021 flu shot\par -recommended strep pneumonia vaccines: Prevnar-13 vaccine, followed by Pneumo vaccine 23 on year following\par -recommended early intervention for URIs\par -recommended osteoporosis evaluations\par -recommended early dermatological evaluations\par -recommended after the age of 50 to the age of 70, colonoscopy every 5 years\par \par F/U in 4 months - [facility] \par He is encouraged to call with any changes, concerns, or questions.

## 2022-09-13 NOTE — PHYSICAL EXAM

## 2022-09-13 NOTE — HISTORY OF PRESENT ILLNESS
[FreeTextEntry1] : Mr. Lombardo is a 56 year old male with a history of abnormal chest CT/PFT, allergic rhinitis, asthma, fibrosis lung, GERD, obesity, poor sleep, primary snoring, and SOB presenting to the office today for a follow up visit. His chief complaint is \par \par -s/p COVID-19 7/2022 (Sx included: body aches, fatigue)\par -he denies dysphonia \par -he notes bowels are good and regular \par -he notes losing weight via more exertion\par -he notes energy levels are good\par -he notes good quality of sleep \par -he notes sleeping for 8+ hrs\par -he denies taking any new vitamins, or supplements \par \par -he denies any arthralgias, chest pain, chest pressure, chills, constipation, coughing, diarrhea, dizziness, dysphagia, fever, headaches, heartburn, reflux, itchy eyes, itchy ears, leg swelling, leg pain, myalgias, nausea, palpitations, sweats, vomiting, wheezing or sour taste in the mouth.

## 2022-09-13 NOTE — PROCEDURE
[FreeTextEntry1] : PFT reveals normal flows, with an FEV1 of 3.06 L, which is 82% of predicted, with a normal flow volume loop. \par \par FENO was 8; a normal value being less than 25\par Fractional exhaled nitric oxide (FENO) is regarded as a simple, noninvasive method for assessing eosinophilic airway inflammation. Produced by a variety of cells within the lung, nitric oxide (NO) concentrations are generally low in healthy individuals. However, high concentrations of NO appear to be involved in nonspecific host defense mechanisms and chronic inflammatory diseases such as asthma. The American Thoracic Society (ATS) therefore has recommended using FENO to aid in the diagnosis and monitoring of eosinophilic airway inflammation and asthma, and for identifying steroid responsive individuals whose chronic respiratory symptoms may be caused by airway inflammation.

## 2022-09-13 NOTE — ADDENDUM
[FreeTextEntry1] : Documented by ISRAEL Diaz acting as a scribe for Dr. Adam Blandon on 09/13/2022 .\par All medical record entries made by the Scribe were at my, Dr. Adam Blandon's, direction and personally dictated by me on 09/13/2022. I have reviewed the chart and agree that the record accurately reflects my personal performance of the history, physical exam, assessment and plan. I have also personally directed, reviewed, and agree with the discharge instructions.

## 2022-10-03 ENCOUNTER — APPOINTMENT (OUTPATIENT)
Dept: PSYCHIATRY | Facility: CLINIC | Age: 57
End: 2022-10-03

## 2022-10-03 PROCEDURE — 99214 OFFICE O/P EST MOD 30 MIN: CPT

## 2022-10-03 RX ORDER — NYSTATIN 100000 [USP'U]/G
100000 CREAM TOPICAL
Qty: 30 | Refills: 0 | Status: DISCONTINUED | COMMUNITY
Start: 2022-09-03

## 2022-10-03 NOTE — PHYSICAL EXAM
[None] : none [Average] : average [Cooperative] : cooperative [Euthymic] : euthymic [Full] : full [Clear] : clear [Linear/Goal Directed] : linear/goal directed [WNL] : within normal limits

## 2022-10-14 NOTE — PLAN
[FreeTextEntry5] : psychoeducation and supportive therapy provided and discussed rationale for recommended meds\par behavior activation and sleep hygiene \par Continue Prozac 20 mg/day, \par Continue Gabapentin 600 mg HS. \par Xanax 0.25 mg once daily as needed. He says he did not need it since last visit. \par Educated patient of importance of being and remaining abstinent from drugs and alcohol. \par Emergency procedures were discussed: pt. educated to call 911 or go to nearest ER for worsening of symptoms/suicidal/homicidal ideation. \par RTC in 3 months or earlier as needed. \par Patient expressed understanding and agreement with above plan. \par \par I stop checked, no controlled substance Rx in past 12 months:Reference #: [id number]\par \par in 2021: Reference #: [id number]\par Rx Written	Rx Dispensed	Drug	Quantity	Days Supply	Prescriber Name	Payment Method	Dispenser\par 01/26/2021	02/04/2021	alprazolam 0.25 mg tablet	30	30	Kasey Gu	Medicare	[id number]\par

## 2022-10-14 NOTE — DISCUSSION/SUMMARY
[FreeTextEntry1] : The patient is a 53-year-old male with anxiety disorder, likely generalized anxiety disorder and panic attacks. \par \par 10/16/2020: Patient's father  2 weeks ago, he is sad but states he is coping. he states he did not switch to Prozac as he was busy taking care of his father, and there is not change with increased sweating and poor heat tolerance and this is limiting his ability to exercise and not able to lose weight and wants to start now. \par \par 2020: Patient reports mild episodes of anxiety, overall stable, less tired with Prozac, will continue same does \par \par 12/15/2020: Patient reports mild, but persistent and frequent episodes of anxiety, no panic attacks, tolerating Prozac, will increase dose\par \par 2021: Patient reports about 10 days after increasing Prozac dose he is excessively tired in the day and has trouble waking up and is not productive, though states he is not depressed \par \par 3/10/2021: Patient reports since December he has been excessively tired in the day, no change with reducing Prozac dose, denies feeling depressed or anxious \par \par 2021: Patient reports excessive daytime fatigue significantly improved by changing the timing of Prozac. He denies feeling depressed or anxious. \par \par 2021: Patient reports no sx of anxiety or depression since last visit. \par \par 10/11/2021: Patient reports slight increase in anxiety recently because of the pain and also feeling that he is getting overwhelmed and feeling stuck in his life. Discussed possibility of increasing Prozac which we had done last year but he felt extremely fatigued at that time. Patient reports he does not want to make any changes at this time and would like to continue with the same dose of medications.\par \par 2021: Patient reports he continues to have random, brief moments of anxiety, but overall anxiety sx in good control, not interfering with his daily functioning. \par \par 3/7/2022: Patient states anxiety is in good control, not depressed. He states he needs to get an MRI for his ankle, and coordinating to get clearances for doing ti under anesthesia has been challenging and asks if he could take his Xanax or another med. States he took Xanax 2 tabs, last time, had minimal benefit, and could take up to 1 mg, half mg 1 hr before and 0.5 mg at the time of the procedure, he plans to have a friend drive him to the appointment. 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 \par \par 10/3/2022: Patient reported no change in anxiety since last visit.

## 2022-10-14 NOTE — HISTORY OF PRESENT ILLNESS
[FreeTextEntry1] : Patient states he is doing okay. He denied feeling depressed and denied anxiety sx. He states he had not seen neurologist in over one year, and they don’t take his medical insurance. States PCP rxed Topamax for one month. The patient states he started to taper topamax because he did not have migraine in a few years and he did not want run out of topamax end of the month and did not want to look for neurologist. \par Sleep and appetite are good. No new medical issues. \par \par

## 2022-11-16 ENCOUNTER — RX RENEWAL (OUTPATIENT)
Age: 57
End: 2022-11-16

## 2022-12-03 ENCOUNTER — EMERGENCY (EMERGENCY)
Facility: HOSPITAL | Age: 57
LOS: 1 days | Discharge: HOME | End: 2022-12-03
Attending: EMERGENCY MEDICINE | Admitting: EMERGENCY MEDICINE
Payer: MEDICARE

## 2022-12-03 VITALS
SYSTOLIC BLOOD PRESSURE: 144 MMHG | HEART RATE: 70 BPM | HEIGHT: 70 IN | WEIGHT: 244.93 LBS | TEMPERATURE: 98 F | RESPIRATION RATE: 18 BRPM | OXYGEN SATURATION: 97 % | DIASTOLIC BLOOD PRESSURE: 83 MMHG

## 2022-12-03 PROCEDURE — 99284 EMERGENCY DEPT VISIT MOD MDM: CPT | Mod: 25

## 2022-12-03 PROCEDURE — 73562 X-RAY EXAM OF KNEE 3: CPT

## 2022-12-03 PROCEDURE — 70450 CT HEAD/BRAIN W/O DYE: CPT | Mod: 26,MA

## 2022-12-03 PROCEDURE — 73562 X-RAY EXAM OF KNEE 3: CPT | Mod: 26,LT

## 2022-12-03 PROCEDURE — 99284 EMERGENCY DEPT VISIT MOD MDM: CPT | Mod: FS

## 2022-12-03 PROCEDURE — 73080 X-RAY EXAM OF ELBOW: CPT | Mod: 26,RT

## 2022-12-03 PROCEDURE — 73080 X-RAY EXAM OF ELBOW: CPT

## 2022-12-03 PROCEDURE — 70450 CT HEAD/BRAIN W/O DYE: CPT | Mod: MA

## 2022-12-03 NOTE — ED PROVIDER NOTE - CLINICAL SUMMARY MEDICAL DECISION MAKING FREE TEXT BOX
Leoncio Solomon for Dr. Campbell     MDM- 57 year old male with PMHx of hiatal hernia, anxiety, asthma, GERD, and pulmonary fibrosis presents to the ED BIBEMS complaining of left knee and right elbow pain s/p mechanical trip and fall at work, no head strike or LOC. Pt states after the fall he felt SOB, dizzy, and hot, though resolved on exam in ED. Exam: NAD. Head NC/AT. PERRL, EOMI. Extremities FROM intact. + Contusion right elbow. Left knee no swelling or erythema. Neuro intact. Impression: Plan: CT head, x-ray right elbow and left knee, and reassess. Imaging negative, will discharge home.

## 2022-12-03 NOTE — ED PROVIDER NOTE - CARE PROVIDER_API CALL
Luis Alfredo Iverson)  Orthopaedic Surgery  [address] [place] [address]  Phone: [phone]  Fax: [phone]  Follow Up Time: 1-3 Days

## 2022-12-03 NOTE — ED PROVIDER NOTE - OBJECTIVE STATEMENT
57-year-old male with history of vertigo, GERD, and COPD brought in by ambulance for evaluation after mechanical fall that occurred prior to arrival.  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  He landed on his right elbow and left knee.  Does not believe he hit his head, however has some mild "fogginess".  Slight discomfort in head but states very mild.  Denies any confusion or memory loss.  No neck or back pain.  Pain in elbow and knee mild in nature.  Reports full range of motion of all ext.  Patient is right-handed.  Denies other injuries or complaints.  Does not take any blood thinners.  PCP Adalberto Escobar

## 2022-12-03 NOTE — ED PROVIDER NOTE - NS_ ATTENDINGSCRIBEDETAILS _ED_A_ED_FT
Ankur Campbell MD - The scribe's documentation has been prepared under my direction and personally reviewed by me in its entirety. I confirm that the note above accurately reflects all work, treatment, procedures, and medical decision making performed by me.

## 2022-12-03 NOTE — ED ADULT NURSE NOTE - OBJECTIVE STATEMENT
patient is trip and fall, c/o feeling dizzy and difficulty of breathing after fell, patient stated he doesn't recall that he hit his head, also c/o nausea, patient refused to have pain medication, will continue to monitor.

## 2022-12-03 NOTE — ED PROVIDER NOTE - NEUROLOGICAL, MLM
Alert and oriented, no focal deficits, no motor or sensory deficits. Symmetric eyebrow raise and smile.  Elevates tongue and shoulders without difficulty.  Normal finger-to-nose.  Good  strength bilaterally.  Ambulatory with steady gait.

## 2022-12-03 NOTE — ED PROVIDER NOTE - DR. NAME
----- Message from Jacquelyn Borges sent at 5/9/2017  9:10 AM CDT -----  Contact: wife Kassy   Patient's  wife Kassy would like to speak to a nurse  She has questions    Please call    Thanks   
Reviewed CYSTO instructions and to check in on 2 nd floor, will need to give a urine sample at that time.  
Campbell

## 2022-12-03 NOTE — ED PROVIDER NOTE - PROGRESS NOTE DETAILS
Patient stable.  Resting comfortably.  Declines pain medication.  X-rays and CT results discussed with patient.  No evidence of acute fracture, skull fracture, or intracranial bleeding.  General soft tissue instructions and head injury instructions discussed.  will follow-up with her primary care doctor.  No vertebral tenderness to suggest vertebral fracture.

## 2022-12-03 NOTE — ED PROVIDER NOTE - CARE PLAN
Principal Discharge DX:	Elbow contusion  Secondary Diagnosis:	Knee contusion  Secondary Diagnosis:	Fall   1

## 2022-12-03 NOTE — ED PROVIDER NOTE - PATIENT PORTAL LINK FT
You can access the FollowMyHealth Patient Portal offered by [facility] by registering at the following website: [email or web address]. By joining [facility]’s FollowMyHealth portal, you will also be able to view your health information using other applications (apps) compatible with our system.

## 2022-12-03 NOTE — ED PROVIDER NOTE - NSFOLLOWUPINSTRUCTIONS_ED_ALL_ED_FT
tylenol or motrin over the counter for pain  ice  follow up with orthopedics if pain continues, referral provided if needed       Elbow Contusion      An elbow contusion is a deep bruise of the elbow. Deep bruises happen when an injury causes bleeding under the skin. The skin over the deep bruise may turn blue, purple, or yellow.     Minor injuries will give you a bruise that is painless. Deep bruises that are worse may stay painful and swollen for a few weeks.       What are the causes?    Common causes of this condition include:  •A hard hit to the elbow.       •An injury (trauma) to the elbow.      •Direct force on the elbow, such as from a fall.        What increases the risk?    You are more likely to develop this condition if you:  •Play sports or do other physical activities.      •Use blood thinners.        What are the signs or symptoms?    Symptoms of this condition include:  •Swelling of the elbow.       •Pain and tenderness of the elbow.       •Change in skin color at the elbow. The area may have redness and then turn blue, purple, or yellow.        How is this treated?    This condition may be treated with:  •Rest, ice, pressure (compression), and elevation. This is often called RICE therapy.      •A sling or splint to support your injury.       •Over-the-counter medicines, such as ibuprofen, for pain control.      •Range-of-motion exercises.        Follow these instructions at home:      RICE therapy   A person holding an ice pack on an injured elbow.   •Rest the injured area.    •If told, put ice on the injured area:  •If you have a removable sling or splint, remove it as told by your doctor.      •Put ice in a plastic bag.      •Place a towel between your skin and the bag.      •Leave the ice on for 20 minutes, 2–3 times a day.      •If told, put light pressure on the injured area using an elastic bandage.  •Make sure the bandage is not wrapped too tightly.      •Remove and reapply the bandage as told by your doctor.        •Raise (elevate) the injured area above the level of your heart while you are sitting or lying down.        If you have a sling or splint:   A sling on a person's arm.   •Wear the sling or splint as told by your doctor. Remove it only as told by your doctor.    •Loosen the sling or splint if your fingers:  •Tingle.      •Become numb.      •Turn cold and blue.        •Keep the sling or splint clean.    •If the sling or splint is not waterproof:  •Do not let it get wet.      •Cover it with a watertight covering when you take a bath or a shower.        General instructions     •Take over-the-counter and prescription medicines only as told by your doctor.      •Return to your normal activities as told by your doctor. Ask your doctor what activities are safe for you.      •Do range-of-motion exercises only as told by your doctor.      •Ask your doctor when it is safe to drive if you have a sling or splint on your arm.      •Wear elbow pads as told by your doctor.      •Keep all follow-up visits as told by your doctor. This is important.        Contact a doctor if:    •Your symptoms do not get better after many days of treatment.      •You have more redness, swelling, or pain in your elbow.      •You have trouble moving the injured area.      •Medicine does not help your pain or swelling.        Get help right away if:    •Your skin over the bruise breaks and starts to bleed.      •You have very bad pain.      •You have numbness in your hand or fingers.      •Your hand or fingers turn very light (pale) or cold.      •You have swelling of your hand and fingers.      •You cannot move your fingers or wrist.        Summary    •An elbow contusion is a deep bruise of the elbow.      •The skin over the deep bruise may turn blue, purple, or yellow.      •Rest the injured area and put ice on the area as told by your doctor.      •If told, put light pressure on the injured area using an elastic bandage.      •Raise (elevate) the injured area above the level of your heart while you are sitting or lying down.      This information is not intended to replace advice given to you by your health care provider. Make sure you discuss any questions you have with your health care provider.

## 2022-12-05 ENCOUNTER — APPOINTMENT (OUTPATIENT)
Dept: PSYCHIATRY | Facility: CLINIC | Age: 57
End: 2022-12-05

## 2022-12-07 ENCOUNTER — TRANSCRIPTION ENCOUNTER (OUTPATIENT)
Age: 57
End: 2022-12-07

## 2022-12-13 ENCOUNTER — RX RENEWAL (OUTPATIENT)
Age: 57
End: 2022-12-13

## 2022-12-14 ENCOUNTER — RX RENEWAL (OUTPATIENT)
Age: 57
End: 2022-12-14

## 2023-01-09 ENCOUNTER — APPOINTMENT (OUTPATIENT)
Dept: PSYCHIATRY | Facility: CLINIC | Age: 58
End: 2023-01-09
Payer: MEDICARE

## 2023-01-09 PROCEDURE — 99213 OFFICE O/P EST LOW 20 MIN: CPT | Mod: 95

## 2023-01-09 RX ORDER — TOBRAMYCIN AND DEXAMETHASONE 3; 1 MG/ML; MG/ML
0.3-0.1 SUSPENSION/ DROPS OPHTHALMIC
Qty: 5 | Refills: 0 | Status: DISCONTINUED | COMMUNITY
Start: 2022-07-15

## 2023-01-09 NOTE — PLAN
[No] : No [Medication education provided] : Medication education provided. [Rationale for medication choices, possible risks/precautions, benefits, alternative treatment choices, and consequences of non-treatment discussed] : Rationale for medication choices, possible risks/precautions, benefits, alternative treatment choices, and consequences of non-treatment discussed with patient/family/caregiver  [FreeTextEntry5] : psychoeducation and supportive therapy provided and discussed rationale for recommended meds\par behavior activation and sleep hygiene \par Continue Prozac 20 mg/day, \par Continue Gabapentin 600 mg HS. \par Xanax 0.25 mg once daily as needed. He says he did not need it since last visit. Last Rx was in 2021. \par Educated patient of importance of being and remaining abstinent from drugs and alcohol. \par Emergency procedures were discussed: pt. educated to call 911 or go to nearest ER for worsening of symptoms/suicidal/homicidal ideation. \par RTC in 3 months or earlier as needed. \par Patient expressed understanding and agreement with above plan. \par \par I stop checked, no controlled substance Rx in past 12 months:Reference #: [id number]

## 2023-01-09 NOTE — HISTORY OF PRESENT ILLNESS
[Home] : at home, [unfilled] , at the time of the visit. [Medical Office: ([facility])___] : at the medical office located in  [Verbal consent obtained from patient] : the patient, [unfilled] [FreeTextEntry1] : Patient states he canceled last appointment because he had a concussion.  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  Patient states he had seen his primary care physician and neurologist at [facility] and had an MRI of the brain.  Patient states for a few weeks he had trouble focusing and felt foggy but about 2 weeks ago he states he started to "clearing up".  Patient reported that holidays were a little bit stressful because there were almost 30 people at home and he took breaks to help him relax.  Patient otherwise reported no change in his medications and no new medical issues since the last visit.  Patient states his sleep and appetite are the same as last time and reported that he is not feeling depressed and denied feeling anxious.\par \par

## 2023-01-09 NOTE — HISTORY OF PRESENT ILLNESS
[Home] : at home, [unfilled] , at the time of the visit. [Medical Office: ([facility])___] : at the medical office located in  [Verbal consent obtained from patient] : the patient, [unfilled] [FreeTextEntry1] : Patient states he canceled last appointment because he had a concussion.  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  Patient states he had seen his primary care physician and neurologist at [facility] and had an MRI of the brain.  Patient states for a few weeks he had trouble focusing and felt foggy but about 2 weeks ago he states he started to "clearing up".  Patient reported that holidays were a little bit stressful because there were almost 30 people at home and he took breaks to help him relax.  Patient otherwise reported no change in his medications and no new medical issues since the last visit.  Patient states his sleep and appetite are the same as last time and reported that he is not feeling depressed and denied feeling anxious.\par \par

## 2023-01-09 NOTE — PLAN
[No] : No [Medication education provided] : Medication education provided. [Rationale for medication choices, possible risks/precautions, benefits, alternative treatment choices, and consequences of non-treatment discussed] : Rationale for medication choices, possible risks/precautions, benefits, alternative treatment choices, and consequences of non-treatment discussed with patient/family/caregiver  [FreeTextEntry5] : psychoeducation and supportive therapy provided and discussed rationale for recommended meds\par behavior activation and sleep hygiene \par Continue Prozac 20 mg/day, \par Continue Gabapentin 600 mg HS. \par Xanax 0.25 mg once daily as needed. He says he did not need it since last visit. Last Rx was in 2021. \par Educated patient of importance of being and remaining abstinent from drugs and alcohol. \par Emergency procedures were discussed: pt. educated to call 911 or go to nearest ER for worsening of symptoms/suicidal/homicidal ideation. \par RTC in 3 months or earlier as needed. \par Patient expressed understanding and agreement with above plan. \par \par I stop checked, no controlled substance Rx in past 12 months:Reference #: [id number]

## 2023-01-09 NOTE — DISCUSSION/SUMMARY
[FreeTextEntry1] : The patient is a 53-year-old male with anxiety disorder, likely generalized anxiety disorder and panic attacks. \par \par 10/16/2020: Patient's father  2 weeks ago, he is sad but states he is coping. he states he did not switch to Prozac as he was busy taking care of his father, and there is not change with increased sweating and poor heat tolerance and this is limiting his ability to exercise and not able to lose weight and wants to start now. \par \par 2020: Patient reports mild episodes of anxiety, overall stable, less tired with Prozac, will continue same does \par \par 12/15/2020: Patient reports mild, but persistent and frequent episodes of anxiety, no panic attacks, tolerating Prozac, will increase dose\par \par 2021: Patient reports about 10 days after increasing Prozac dose he is excessively tired in the day and has trouble waking up and is not productive, though states he is not depressed \par \par 3/10/2021: Patient reports since December he has been excessively tired in the day, no change with reducing Prozac dose, denies feeling depressed or anxious \par \par 2021: Patient reports excessive daytime fatigue significantly improved by changing the timing of Prozac. He denies feeling depressed or anxious. \par \par 2021: Patient reports no sx of anxiety or depression since last visit. \par \par 10/11/2021: Patient reports slight increase in anxiety recently because of the pain and also feeling that he is getting overwhelmed and feeling stuck in his life. Discussed possibility of increasing Prozac which we had done last year but he felt extremely fatigued at that time. Patient reports he does not want to make any changes at this time and would like to continue with the same dose of medications.\par \par 2021: Patient reports he continues to have random, brief moments of anxiety, but overall anxiety sx in good control, not interfering with his daily functioning. \par \par 3/7/2022: Patient states anxiety is in good control, not depressed. He states he needs to get an MRI for his ankle, and coordinating to get clearances for doing ti under anesthesia has been challenging and asks if he could take his Xanax or another med. States he took Xanax 2 tabs, last time, had minimal benefit, and could take up to 1 mg, half mg 1 hr before and 0.5 mg at the time of the procedure, he plans to have a friend drive him to the appointment. 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 \par \par 10/3/2022: Patient reported no change in anxiety since last visit. \par \par 2023: Patient reported that anxiety symptoms remain in good control and he had a concussion a few weeks ago and brain imaging was negative but had post concussion symptoms which he reports are improving in the past couple of weeks.

## 2023-03-07 ENCOUNTER — APPOINTMENT (OUTPATIENT)
Dept: PULMONOLOGY | Facility: CLINIC | Age: 58
End: 2023-03-07
Payer: MEDICARE

## 2023-03-07 ENCOUNTER — NON-APPOINTMENT (OUTPATIENT)
Age: 58
End: 2023-03-07

## 2023-03-07 VITALS
HEIGHT: 70 IN | WEIGHT: 231 LBS | DIASTOLIC BLOOD PRESSURE: 72 MMHG | BODY MASS INDEX: 33.07 KG/M2 | RESPIRATION RATE: 16 BRPM | OXYGEN SATURATION: 94 % | TEMPERATURE: 97.6 F | SYSTOLIC BLOOD PRESSURE: 118 MMHG | HEART RATE: 78 BPM

## 2023-03-07 DIAGNOSIS — J84.10 PULMONARY FIBROSIS, UNSPECIFIED: ICD-10-CM

## 2023-03-07 PROCEDURE — 94010 BREATHING CAPACITY TEST: CPT

## 2023-03-07 PROCEDURE — 99214 OFFICE O/P EST MOD 30 MIN: CPT | Mod: 25

## 2023-03-07 PROCEDURE — 95012 NITRIC OXIDE EXP GAS DETER: CPT

## 2023-03-07 NOTE — HISTORY OF PRESENT ILLNESS
[FreeTextEntry1] : Mr. Lombardo is a 57 year old male with a history of abnormal chest CT/PFT, allergic rhinitis, asthma, fibrosis lung, GERD, obesity, poor sleep, primary snoring, and SOB presenting to the office today for a follow up visit. His chief complaint is \par \par -he notes dealing with back issues\par -he notes feeling generally well besides myalgias\par -he notes intermittent nausea due to Rx\par -he notes bowels are regular \par -he notes vision is stable\par -he notes losing weight \par -he notes good quality of sleep \par -he denies taking any new medications, vitamins, or supplements \par -he notes appetite is stable \par -he notes diet is good \par \par -he denies any headaches, emesis, fever, chills, sweats, chest pressure, coughing, wheezing, palpitations, constipation, diarrhea, vertigo, dysphagia, heartburn, reflux, itchy eyes, itchy ears, leg swelling, leg pain, arthralgias, or sour taste in the mouth.

## 2023-03-07 NOTE — PHYSICAL EXAM
[No Acute Distress] : no acute distress [Normal Oropharynx] : normal oropharynx [Normal Appearance] : normal appearance [No Neck Mass] : no neck mass [Normal Rate/Rhythm] : normal rate/rhythm [Normal S1, S2] : normal s1, s2 [No Murmurs] : no murmurs [No Resp Distress] : no resp distress [Clear to Auscultation Bilaterally] : clear to auscultation bilaterally [No Abnormalities] : no abnormalities [Benign] : benign [Normal Gait] : normal gait [No Clubbing] : no clubbing [No Cyanosis] : no cyanosis [No Edema] : no edema [FROM] : FROM [Normal Color/ Pigmentation] : normal color/ pigmentation [No Focal Deficits] : no focal deficits [Oriented x3] : oriented x3 [Normal Affect] : normal affect [III] : Mallampati Class: III [TextBox_2] : OW [TextBox_68] : I:E 1:3, clear

## 2023-03-07 NOTE — ADDENDUM
[FreeTextEntry1] : Documented by ISRAEL Diaz acting as a scribe for Dr. Adam Blandon on 03/07/2023 .\par \par All medical record entries made by the Scribe were at my, Dr. Adam Blandon's, direction and personally dictated by me on 03/07/2023. I have reviewed the chart and agree that the record accurately reflects my personal performance of the history, physical exam, assessment and plan. I have also personally directed, reviewed, and agree with the discharge instructions.

## 2023-03-07 NOTE — ASSESSMENT
[FreeTextEntry1] : Mr. Lombardo is a 57 year old male who has a history of asthma, allergies, GERD, primary snoring and shortness of breath. He is stable from a pulmonary perspective, aside from some shortness of breath at rest (?posture/ stress/ likely mechanical). (still) -Stress induced SOB (life better); s/p COVID-19 7/2022- resolved- stable\par \par His shortness of breath is multifactorial due to:\par -poor breathing mechanics\par -asthma\par -overweight / out of shape\par -anxiety\par -?CAD\par \par problem 1: asthma -(controlled)\par -continue Anoro at 1 inhalation QD\par -continue Arnuity 200 at 1 inhalation QD\par -continue Singulair 10 mg QHS \par -continue Ventolin 2 puffs QHS\par -Asthma is  believed to be caused by inherited (genetic) and environmental factor, but its exact cause is unknown. Asthma may be triggered by allergens, lung infections, or irritants in the air. Asthma triggers are different for each person\par -Inhaler technique reviewed as well as oral hygiene techniques reviewed with patient. Avoidance of cold air, extremes of temperature, rescue inhaler should be used before exercise. Order of medication reviewed with patient. Recommended use of a cool mist humidifier in the bedroom.\par \par problem 2: GERD, hiatal hernia (stable)\par -continue Omeprazole 40 mg before breakfast\par -Rule of 2s: avoid eating too much, eating too late, eating too spicy, eating two hours before bed\par -Things to avoid including overeating, spicy foods, tight clothing, eating within three hours of bed, this list is not all inclusive. \par -For treatment of reflux, possible options discussed including diet control, H2 blockers, PPIs, as well as coating motility agents discussed as treatment options. Timing of meals and proximity of last meal to sleep were discussed. If symptoms persist, a formal gastrointestinal evaluation is needed.\par \par problem 3: primary snoring\par -recommended to use Oxy Aid, "Slumber Bump" and "Somnifix" \par Good sleep hygiene was encouraged including avoiding watching television an hour before bed, keeping caffeine at a low, avoiding reading, television, or anything, in bed, no drinking any liquids three hours before bedtime, and only getting into bed when tired and ready for sleep. \par \par problem 4: poor sleep- improved\par -f/u with psychiatrist\par -recommended Sleep Guard \par \par problem 5: allergies\par -continue Clarinex 5 mg QHS\par -Environmental measures for allergies were encouraged including mattress and pillow cover, air purifier, and environmental controls.\par \par problem 6: poor breathing mechanics (Wim Hof / Buteyko)\par -recommended Calm Harry\par -Proper breathing techniques were reviewed with an emphasis of exhalation. Patient instructed to breath in for 1 second and out for four seconds. Patient was encouraged to not talk while walking.\par \par problem 7: obesity - need to improve\par -Weight loss, exercise, and diet control were discussed and are highly encouraged. Treatment options were given such as, aqua therapy, and contacting a nutritionist. Recommended to use the elliptical, stationary bike, less use of treadmill.  Obesity is associated with worsening asthma, shortness of breath, and potential for cardiac disease, diabetes, and other underlying medical conditions.\par \par problem 8: anxiety\par -recommended to see Dr. Gu \par -recommended to read "The Gift of Maybe"\par \par Problem 9: Health Maintenance/COVID19 Precautions:\par -s/p COVID-19 7/2022\par -S/p Covid 19 vaccine (Moderna) x2\par - Clean your hands often. Wash your hands often with soap and water for at least 20 seconds, especially after blowing your nose, coughing, or sneezing, or having been in a public place.\par - If soap and water are not available, use a hand  that contains at least 60% alcohol.\par - To the extent possible, avoid touching high-touch surfaces in public places - elevator buttons, door handles, handrails, handshaking with people, etc. Use a tissue or your sleeve to cover your hand or finger if you must touch something.\par - Wash your hands after touching surfaces in public places.\par - Avoid touching your face, nose, eyes, etc.\par - Clean and disinfect your home to remove germs: practice routine cleaning of frequently touched surfaces (for example: tables, doorknobs, light switches, handles, desks, toilets, faucets, sinks & cell phones)\par - Avoid crowds, especially in poorly ventilated spaces. Your risk of exposure to respiratory viruses like COVID-19 may increase in crowded, closed-in settings with little air circulation if there are people in the crowd who are sick. All patients are recommended to practice social distancing and stay at least 6 feet away from others.\par - Avoid all non-essential travel including plane trips, and especially avoid embarking on cruise ships.\par -If COVID-19 is spreading in your community, take extra measures to put distance between yourself and other people to further reduce your risk of being exposed to this new virus.\par -Stay home as much as possible.\par - Consider ways of getting food brought to your house through family, social, or commercial networks\par -Be aware that the virus has been known to live in the air up to 3 hours post exposure, cardboard up to 24 hours post exposure, copper up to 4 hours post exposure, steel and plastic up to 2-3 days post exposure. Risk of transmission from these surfaces are affected by many variables.\par Immune Support Recommendations:\par -OTC Vitamin C 500mg BID \par -OTC Quercetin 250-500mg BID \par -OTC Zinc 75-100mg per day \par -OTC Melatonin 1 or 2 mg a night \par -OTC Vitamin D 1-4000mg per day \par -OTC Tonic Water 8oz per day\par Asthma and COVID19:\par You need to make sure your asthma is under control. This often requires the use of inhaled corticosteroids (and sometimes oral corticosteroids). Inhaled corticosteroids do not likely reduce your immune system’s ability to fight infections, but oral corticosteroids may. It is important to use the steps above to protect yourself to limit your exposure to any respiratory virus.\par \par problem 10: health maintenance\par -recommended Well care and cancer screening in the face of BRCA positive (Hematology evaluation pending) \par -recommended yearly flu shot 2022 refused\par -recommended strep pneumonia vaccines: Prevnar-13 vaccine, followed by Pneumo vaccine 23 on year following\par -recommended early intervention for URIs\par -recommended osteoporosis evaluations\par -recommended early dermatological evaluations\par -recommended after the age of 50 to the age of 70, colonoscopy every 5 years\par \par F/U in 4 months - [facility] \par He is encouraged to call with any changes, concerns, or questions.

## 2023-03-07 NOTE — PROCEDURE
[FreeTextEntry1] : PFT reveals normal flows, with an FEV1 of  2.90L, which is 78% of predicted, with a normal flow volume loop. \par \par FENO was 11; a normal value being less than 25\par Fractional exhaled nitric oxide (FENO) is regarded as a simple, noninvasive method for assessing eosinophilic airway inflammation. Produced by a variety of cells within the lung, nitric oxide (NO) concentrations are generally low in healthy individuals. However, high concentrations of NO appear to be involved in nonspecific host defense mechanisms and chronic inflammatory diseases such as asthma. The American Thoracic Society (ATS) therefore has recommended using FENO to aid in the diagnosis and monitoring of eosinophilic airway inflammation and asthma, and for identifying steroid responsive individuals whose chronic respiratory symptoms may be caused by airway inflammation.

## 2023-06-09 ENCOUNTER — RX RENEWAL (OUTPATIENT)
Age: 58
End: 2023-06-09

## 2023-06-10 ENCOUNTER — RX RENEWAL (OUTPATIENT)
Age: 58
End: 2023-06-10

## 2023-06-12 ENCOUNTER — APPOINTMENT (OUTPATIENT)
Dept: PSYCHIATRY | Facility: CLINIC | Age: 58
End: 2023-06-12
Payer: MEDICARE

## 2023-06-12 PROCEDURE — 99442: CPT | Mod: 95

## 2023-06-12 NOTE — DISCUSSION/SUMMARY
[FreeTextEntry1] : The patient is a 53-year-old male with anxiety disorder, likely generalized anxiety disorder and panic attacks. \par \par 10/16/2020: Patient's father  2 weeks ago, he is sad but states he is coping. he states he did not switch to Prozac as he was busy taking care of his father, and there is not change with increased sweating and poor heat tolerance and this is limiting his ability to exercise and not able to lose weight and wants to start now. \par \par 2020: Patient reports mild episodes of anxiety, overall stable, less tired with Prozac, will continue same does \par \par 12/15/2020: Patient reports mild, but persistent and frequent episodes of anxiety, no panic attacks, tolerating Prozac, will increase dose\par \par 2021: Patient reports about 10 days after increasing Prozac dose he is excessively tired in the day and has trouble waking up and is not productive, though states he is not depressed \par \par 3/10/2021: Patient reports since December he has been excessively tired in the day, no change with reducing Prozac dose, denies feeling depressed or anxious \par \par 2021: Patient reports excessive daytime fatigue significantly improved by changing the timing of Prozac. He denies feeling depressed or anxious. \par \par 2021: Patient reports no sx of anxiety or depression since last visit. \par \par 10/11/2021: Patient reports slight increase in anxiety recently because of the pain and also feeling that he is getting overwhelmed and feeling stuck in his life. Discussed possibility of increasing Prozac which we had done last year but he felt extremely fatigued at that time. Patient reports he does not want to make any changes at this time and would like to continue with the same dose of medications.\par \par 2021: Patient reports he continues to have random, brief moments of anxiety, but overall anxiety sx in good control, not interfering with his daily functioning. \par \par 3/7/2022: Patient states anxiety is in good control, not depressed. He states he needs to get an MRI for his ankle, and coordinating to get clearances for doing ti under anesthesia has been challenging and asks if he could take his Xanax or another med. States he took Xanax 2 tabs, last time, had minimal benefit, and could take up to 1 mg, half mg 1 hr before and 0.5 mg at the time of the procedure, he plans to have a friend drive him to the appointment. 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 \par \par 10/3/2022: Patient reported no change in anxiety since last visit. \par \par 2023: Patient reported that anxiety symptoms remain in good control and he had a concussion a few weeks ago and brain imaging was negative but had post concussion symptoms which he reports are improving in the past couple of weeks.\par \par 2023:  Patient continues to remain stable and anxiety symptoms remain in good control\par

## 2023-06-12 NOTE — HISTORY OF PRESENT ILLNESS
[Home] : at home, [unfilled] , at the time of the visit. [Medical Office: ([facility])___] : at the medical office located in  [Verbal consent obtained from patient] : the patient, [unfilled] [FreeTextEntry1] : Patient joined the session on [employer] but camera did not connect and the session was conducted as audio only visit. \par Patient states he is doing good.  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 He states when seasons changed he had a brief period of light headed, which now resolved, otherwise No new medical issues, no new medication since last visit\par He states mom is doing good. \par \par

## 2023-06-12 NOTE — PLAN
[No] : No [Medication education provided] : Medication education provided. [Rationale for medication choices, possible risks/precautions, benefits, alternative treatment choices, and consequences of non-treatment discussed] : Rationale for medication choices, possible risks/precautions, benefits, alternative treatment choices, and consequences of non-treatment discussed with patient/family/caregiver  [FreeTextEntry5] : psychoeducation and supportive therapy provided and discussed rationale for recommended meds\par behavior activation and sleep hygiene \par Continue Prozac 20 mg/day, \par Continue Gabapentin 600 mg HS. \par Xanax 0.25 mg once daily as needed. He says he did not need it since last visit. Last Rx was in 2021. \par Educated patient of importance of being and remaining abstinent from drugs and alcohol. \par Emergency procedures were discussed: pt. educated to call 911 or go to nearest ER for worsening of symptoms/suicidal/homicidal ideation. \par RTC in 3 months or earlier as needed. \par Patient expressed understanding and agreement with above plan. \par \par I stop checked, no controlled substance Rx in past 12 months: Reference #: [id number]

## 2023-06-12 NOTE — REASON FOR VISIT
[Patient] : Patient [Continuity of care] : to ensure continuity of care [Telephone (audio) - Individual/Group] : This telephonic visit was provided via audio only technology. [Technical or other issues] : Patient unable to effectively utilize tele-video due to technical or other issues. [Medical Office: ([facility])___] : The provider was located at the medical office in [unfilled]. [Home] : The patient, [unfilled], was located at home, [unfilled], at the time of the visit. [Verbal consent obtained from patient/other participant(s)] : Verbal consent for telehealth/telephonic services obtained from patient/other participant(s) [FreeTextEntry1] : anxiety

## 2023-06-12 NOTE — PHYSICAL EXAM
[None] : none [Average] : average [Cooperative] : cooperative [Euthymic] : euthymic [Clear] : clear [Full] : full [Linear/Goal Directed] : linear/goal directed [WNL] : within normal limits [None Reported] : none reported [FreeTextEntry7] : No SI/HI

## 2023-06-22 NOTE — ED ADULT NURSE NOTE - PERIPHERAL VASCULAR
Per Dr. Orr patient instructed she can use 2 tablets of the oxycodone but would need to be 6 hours apart instead of 4 hours apart. We recommend this for only a few doses/days and then back to 1 tablet every 4-6 hours.  Patient will call if further questions or concerns.   - - -

## 2023-07-14 ENCOUNTER — RX RENEWAL (OUTPATIENT)
Age: 58
End: 2023-07-14

## 2023-07-14 RX ORDER — FLUTICASONE FUROATE 200 UG/1
200 POWDER RESPIRATORY (INHALATION)
Qty: 90 | Refills: 1 | Status: ACTIVE | COMMUNITY
Start: 2018-03-06 | End: 1900-01-01

## 2023-09-12 ENCOUNTER — APPOINTMENT (OUTPATIENT)
Dept: PULMONOLOGY | Facility: CLINIC | Age: 58
End: 2023-09-12
Payer: MEDICARE

## 2023-09-12 VITALS
DIASTOLIC BLOOD PRESSURE: 70 MMHG | HEART RATE: 79 BPM | RESPIRATION RATE: 16 BRPM | SYSTOLIC BLOOD PRESSURE: 122 MMHG | BODY MASS INDEX: 34.07 KG/M2 | HEIGHT: 70 IN | OXYGEN SATURATION: 96 % | WEIGHT: 238 LBS | TEMPERATURE: 97.2 F

## 2023-09-12 DIAGNOSIS — Z86.19 PERSONAL HISTORY OF OTHER INFECTIOUS AND PARASITIC DISEASES: ICD-10-CM

## 2023-09-12 DIAGNOSIS — Z01.811 ENCOUNTER FOR PREPROCEDURAL RESPIRATORY EXAMINATION: ICD-10-CM

## 2023-09-12 PROCEDURE — 95012 NITRIC OXIDE EXP GAS DETER: CPT

## 2023-09-12 PROCEDURE — 94729 DIFFUSING CAPACITY: CPT

## 2023-09-12 PROCEDURE — 94010 BREATHING CAPACITY TEST: CPT

## 2023-09-12 PROCEDURE — 99214 OFFICE O/P EST MOD 30 MIN: CPT | Mod: 25

## 2023-09-12 PROCEDURE — 94727 GAS DIL/WSHOT DETER LNG VOL: CPT

## 2023-10-23 ENCOUNTER — APPOINTMENT (OUTPATIENT)
Dept: PSYCHIATRY | Facility: CLINIC | Age: 58
End: 2023-10-23
Payer: MEDICARE

## 2023-10-23 PROCEDURE — 99213 OFFICE O/P EST LOW 20 MIN: CPT

## 2023-12-25 ENCOUNTER — RX RENEWAL (OUTPATIENT)
Age: 58
End: 2023-12-25

## 2023-12-25 RX ORDER — MONTELUKAST 10 MG/1
10 TABLET, FILM COATED ORAL
Qty: 90 | Refills: 1 | Status: ACTIVE | COMMUNITY
Start: 2018-07-10 | End: 1900-01-01

## 2024-01-09 ENCOUNTER — APPOINTMENT (OUTPATIENT)
Dept: PSYCHIATRY | Facility: CLINIC | Age: 59
End: 2024-01-09

## 2024-01-23 ENCOUNTER — APPOINTMENT (OUTPATIENT)
Dept: PULMONOLOGY | Facility: CLINIC | Age: 59
End: 2024-01-23
Payer: MEDICARE

## 2024-01-23 VITALS
HEIGHT: 68 IN | DIASTOLIC BLOOD PRESSURE: 82 MMHG | BODY MASS INDEX: 36.07 KG/M2 | RESPIRATION RATE: 16 BRPM | OXYGEN SATURATION: 95 % | TEMPERATURE: 97 F | SYSTOLIC BLOOD PRESSURE: 120 MMHG | WEIGHT: 238 LBS | HEART RATE: 76 BPM

## 2024-01-23 DIAGNOSIS — E66.9 OBESITY, UNSPECIFIED: ICD-10-CM

## 2024-01-23 DIAGNOSIS — J45.909 UNSPECIFIED ASTHMA, UNCOMPLICATED: ICD-10-CM

## 2024-01-23 DIAGNOSIS — Z72.820 SLEEP DEPRIVATION: ICD-10-CM

## 2024-01-23 DIAGNOSIS — K21.9 GASTRO-ESOPHAGEAL REFLUX DISEASE W/OUT ESOPHAGITIS: ICD-10-CM

## 2024-01-23 DIAGNOSIS — R06.02 SHORTNESS OF BREATH: ICD-10-CM

## 2024-01-23 DIAGNOSIS — R93.89 ABNORMAL FINDINGS ON DIAGNOSTIC IMAGING OF OTHER SPECIFIED BODY STRUCTURES: ICD-10-CM

## 2024-01-23 DIAGNOSIS — R94.2 ABNORMAL RESULTS OF PULMONARY FUNCTION STUDIES: ICD-10-CM

## 2024-01-23 DIAGNOSIS — K44.9 DIAPHRAGMATIC HERNIA W/OUT OBSTRUCTION OR GANGRENE: ICD-10-CM

## 2024-01-23 DIAGNOSIS — J30.9 ALLERGIC RHINITIS, UNSPECIFIED: ICD-10-CM

## 2024-01-23 PROCEDURE — 95012 NITRIC OXIDE EXP GAS DETER: CPT

## 2024-01-23 PROCEDURE — 99214 OFFICE O/P EST MOD 30 MIN: CPT | Mod: 25

## 2024-01-23 PROCEDURE — 94010 BREATHING CAPACITY TEST: CPT

## 2024-01-23 RX ORDER — ALBUTEROL SULFATE 90 UG/1
108 (90 BASE) AEROSOL, METERED RESPIRATORY (INHALATION) EVERY 6 HOURS
Qty: 3 | Refills: 1 | Status: ACTIVE | COMMUNITY
Start: 2018-07-10 | End: 1900-01-01

## 2024-01-23 RX ORDER — UMECLIDINIUM BROMIDE AND VILANTEROL TRIFENATATE 62.5; 25 UG/1; UG/1
62.5-25 POWDER RESPIRATORY (INHALATION)
Qty: 180 | Refills: 1 | Status: ACTIVE | COMMUNITY
Start: 2022-12-14 | End: 1900-01-01

## 2024-01-23 NOTE — PROCEDURE
[FreeTextEntry1] : PFTs revealed mild restrictive dysfunction; FEV1 was 2.63L, which is 72% of predicted; normal flow volume loop. PFTs were performed to evaluate for asthma  FENO was 10; a normal value being less than 25 Fractional exhaled nitric oxide (FENO) is regarded as a simple, noninvasive method for assessing eosinophilic airway inflammation. Produced by a variety of cells within the lung, nitric oxide (NO) concentrations are generally low in healthy individuals. However, high concentrations of NO appear to be involved in nonspecific host defense mechanisms and chronic inflammatory diseases such as asthma. The American Thoracic Society (ATS) therefore has recommended using FENO to aid in the diagnosis and monitoring of eosinophilic airway inflammation and asthma, and for identifying steroid responsive individuals whose chronic respiratory symptoms may be caused by airway inflammation.

## 2024-01-23 NOTE — ADDENDUM
[FreeTextEntry1] : Documented by Lorraine Fuentes acting as a scribe for Dr. Adam Blandon on 01/23/2024. All medical record entries made by the Scribe were at my, Dr. Adam Blandon's, direction and personally dictated by me on 01/23/2024. I have reviewed the chart and agree that the record accurately reflects my personal performance of the history, physical exam, assessment and plan. I have also personally directed, reviewed, and agree with the discharge instructions.

## 2024-01-23 NOTE — ASSESSMENT
[FreeTextEntry1] : Mr. Lombardo is a 58 year old male who has a history of asthma, allergies, GERD, primary snoring and shortness of breath. He is stable from a pulmonary perspective, aside from some shortness of breath at rest (?posture/ stress/ likely mechanical). (still) -Stress induced SOB (life better); s/p COVID-19 7/2022- resolved- stable from a pulmonary perspective- s/p EgD/colonoscopy for reflux- stable/improved  His shortness of breath is multifactorial due to: -poor breathing mechanics -asthma -overweight / out of shape -anxiety -?CAD  problem 1: asthma -(controlled) -continue Anoro at 1 inhalation QD -continue Arnuity 200 at 1 inhalation QD -continue Singulair 10 mg QHS -continue Ventolin 2 puffs QHS -Asthma is believed to be caused by inherited (genetic) and environmental factor, but its exact cause is unknown. Asthma may be triggered by allergens, lung infections, or irritants in the air. Asthma triggers are different for each person -Inhaler technique reviewed as well as oral hygiene techniques reviewed with patient. Avoidance of cold air, extremes of temperature, rescue inhaler should be used before exercise. Order of medication reviewed with patient. Recommended use of a cool mist humidifier in the bedroom.  problem 2: GERD, hiatal hernia (active) -continue Protonix 40 mg QAM, pre-breakfast  -Rule of 2s: avoid eating too much, eating too late, eating too spicy, eating two hours before bed -Things to avoid including overeating, spicy foods, tight clothing, eating within three hours of bed, this list is not all inclusive. -For treatment of reflux, possible options discussed including diet control, H2 blockers, PPIs, as well as coating motility agents discussed as treatment options. Timing of meals and proximity of last meal to sleep were discussed. If symptoms persist, a formal gastrointestinal evaluation is needed.  problem 3: primary snoring -recommended to use Oxy Aid, "Slumber Bump" and "Somnifix" Good sleep hygiene was encouraged including avoiding watching television an hour before bed, keeping caffeine at a low, avoiding reading, television, or anything, in bed, no drinking any liquids three hours before bedtime, and only getting into bed when tired and ready for sleep.  problem 4: poor sleep- improved -f/u with psychiatrist -recommended Sleep Guard  problem 5: allergies -continue Clarinex 5 mg QHS -Environmental measures for allergies were encouraged including mattress and pillow cover, air purifier, and environmental controls.  problem 6: poor breathing mechanics (Wim Shunf / Joi) -recommended Calm Harry -Proper breathing techniques were reviewed with an emphasis of exhalation. Patient instructed to breath in for 1 second and out for four seconds. Patient was encouraged to not talk while walking.  problem 7: obesity - need to improve -recommended Berberine OTC supplement for visceral fat loss  -Weight loss, exercise, and diet control were discussed and are highly encouraged. Treatment options were given such as, aqua therapy, and contacting a nutritionist. Recommended to use the elliptical, stationary bike, less use of treadmill. Obesity is associated with worsening asthma, shortness of breath, and potential for cardiac disease, diabetes, and other underlying medical conditions.  problem 8: anxiety -recommended to see Dr. Gu -recommended to read "The Gift of Maybe"  Problem 9: Health Maintenance/COVID19 Precautions: -s/p COVID-19 7/2022 -S/p Covid 19 vaccine (Moderna) x2 - Clean your hands often. Wash your hands often with soap and water for at least 20 seconds, especially after blowing your nose, coughing, or sneezing, or having been in a public place. - If soap and water are not available, use a hand  that contains at least 60% alcohol. - To the extent possible, avoid touching high-touch surfaces in public places - elevator buttons, door handles, handrails, handshaking with people, etc. Use a tissue or your sleeve to cover your hand or finger if you must touch something. - Wash your hands after touching surfaces in public places. - Avoid touching your face, nose, eyes, etc. - Clean and disinfect your home to remove germs: practice routine cleaning of frequently touched surfaces (for example: tables, doorknobs, light switches, handles, desks, toilets, faucets, sinks & cell phones) - Avoid crowds, especially in poorly ventilated spaces. Your risk of exposure to respiratory viruses like COVID-19 may increase in crowded, closed-in settings with little air circulation if there are people in the crowd who are sick. All patients are recommended to practice social distancing and stay at least 6 feet away from others. - Avoid all non-essential travel including plane trips, and especially avoid embarking on cruise ships. -If COVID-19 is spreading in your community, take extra measures to put distance between yourself and other people to further reduce your risk of being exposed to this new virus. -Stay home as much as possible. - Consider ways of getting food brought to your house through family, social, or commercial networks -Be aware that the virus has been known to live in the air up to 3 hours post exposure, cardboard up to 24 hours post exposure, copper up to 4 hours post exposure, steel and plastic up to 2-3 days post exposure. Risk of transmission from these surfaces are affected by many variables. Immune Support Recommendations: -OTC Vitamin C 500mg BID -OTC Quercetin 250-500mg BID -OTC Zinc 75-100mg per day -OTC Melatonin 1 or 2 mg a night -OTC Vitamin D 1-4000mg per day -OTC Tonic Water 8oz per day Asthma and COVID19: You need to make sure your asthma is under control. This often requires the use of inhaled corticosteroids (and sometimes oral corticosteroids). Inhaled corticosteroids do not likely reduce your immune system's ability to fight infections, but oral corticosteroids may. It is important to use the steps above to protect yourself to limit your exposure to any respiratory virus.  problem 10: health maintenance -recommended Well [facility] and cancer screening in the face of BRCA positive (Hematology evaluation pending) -recommended yearly flu shot 2023- refused -recommended strep pneumonia vaccines: Prevnar-13 vaccine, followed by Pneumo vaccine 23 on year following -recommended early intervention for URIs -recommended osteoporosis evaluations -recommended early dermatological evaluations -recommended after the age of 50 to the age of 70, colonoscopy every 5 years  F/P in 4 months - [facility] He is encouraged to call with any changes, concerns, or questions.

## 2024-01-23 NOTE — HISTORY OF PRESENT ILLNESS
Patient is in the supine position.   Procedure performed on a bed/cart.The patient was positioned by Ila Romero RN, Sanju Taylor RN, [place] Gonsales [FreeTextEntry1] : Mr. Lombardo is a 58 year old male with a history of abnormal chest CT/PFT, allergic rhinitis, asthma, fibrosis lung, GERD, obesity, poor sleep, primary snoring, and SOB presenting to the office today for a follow-up pulmonary evaluation. His chief complaint is   -he notes feeling generally well  -he notes his bloodwork and urine came back normal, except for elevated PSA -he notes bowels are regular  -he denies dysphonia  -he denies exercising in the winter, except for walking up and down the aisles in the supermarket -he notes sleeping for  8-10 hours -he notes having back and neck pain due to his shoulder issues -he notes his blood sugar and cholesterol levels are well-controlled -he denies dysphonia  -he notes he's now switched from Omeprazole to Pantoprazole because he was on Omeprazole for 10 years. His GERD is controlled -he notes weight is stable  -he notes he had chills and sweats prior due to having a URI. He tested negative for COVID-19 -he notes s/p endoscopy and colonoscopy. He still has a hiatal hernia, and the inflammation in his intestines resolved  -he denies any headaches, nausea, emesis, fever, chest pain, chest pressure, coughing, wheezing, palpitations, diarrhea, constipation, dysphagia, vertigo, arthralgias, leg swelling, itchy eyes, itchy ears, heartburn, reflux, or sour taste in the mouth.

## 2024-01-23 NOTE — PHYSICAL EXAM
[No Acute Distress] : no acute distress [Normal Oropharynx] : normal oropharynx [III] : Mallampati Class: III [Normal Appearance] : normal appearance [No Neck Mass] : no neck mass [Normal Rate/Rhythm] : normal rate/rhythm [Normal S1, S2] : normal s1, s2 [No Murmurs] : no murmurs [No Resp Distress] : no resp distress [Clear to Auscultation Bilaterally] : clear to auscultation bilaterally [No Abnormalities] : no abnormalities [Benign] : benign [Normal Gait] : normal gait [No Clubbing] : no clubbing [No Edema] : no edema [No Cyanosis] : no cyanosis [Normal Color/ Pigmentation] : normal color/ pigmentation [FROM] : FROM [No Focal Deficits] : no focal deficits [Oriented x3] : oriented x3 [Normal Affect] : normal affect [TextBox_2] : OW [TextBox_68] : I:E 1:3, clear

## 2024-01-31 ENCOUNTER — APPOINTMENT (OUTPATIENT)
Dept: PSYCHIATRY | Facility: CLINIC | Age: 59
End: 2024-01-31
Payer: MEDICARE

## 2024-01-31 PROCEDURE — 99213 OFFICE O/P EST LOW 20 MIN: CPT

## 2024-02-07 RX ORDER — FLUTICASONE FUROATE 200 UG/1
200 POWDER RESPIRATORY (INHALATION)
Qty: 90 | Refills: 1 | Status: ACTIVE | COMMUNITY
Start: 2019-07-18 | End: 1900-01-01

## 2024-03-31 NOTE — DISCUSSION/SUMMARY
[FreeTextEntry1] : The patient is a 53-year-old male with anxiety disorder, likely generalized anxiety disorder and panic attacks.   10/16/2020: Patient's father  2 weeks ago, he is sad but states he is coping. he states he did not switch to Prozac as he was busy taking care of his father, and there is not change with increased sweating and poor heat tolerance and this is limiting his ability to exercise and not able to lose weight and wants to start now.   2020: Patient reports mild episodes of anxiety, overall stable, less tired with Prozac, will continue same does   12/15/2020: Patient reports mild, but persistent and frequent episodes of anxiety, no panic attacks, tolerating Prozac, will increase dose  2021: Patient reports about 10 days after increasing Prozac dose he is excessively tired in the day and has trouble waking up and is not productive, though states he is not depressed   3/10/2021: Patient reports since December he has been excessively tired in the day, no change with reducing Prozac dose, denies feeling depressed or anxious   2021: Patient reports excessive daytime fatigue significantly improved by changing the timing of Prozac. He denies feeling depressed or anxious.   2021: Patient reports no sx of anxiety or depression since last visit.   10/11/2021: Patient reports slight increase in anxiety recently because of the pain and also feeling that he is getting overwhelmed and feeling stuck in his life. Discussed possibility of increasing Prozac which we had done last year but he felt extremely fatigued at that time. Patient reports he does not want to make any changes at this time and would like to continue with the same dose of medications.  2021: Patient reports he continues to have random, brief moments of anxiety, but overall anxiety sx in good control, not interfering with his daily functioning.   3/7/2022: Patient states anxiety is in good control, not depressed. He states he needs to get an MRI for his ankle, and coordinating to get clearances for doing ti under anesthesia has been challenging and asks if he could take his Xanax or another med. States he took Xanax 2 tabs, last time, had minimal benefit, and could take up to 1 mg, half mg 1 hr before and 0.5 mg at the time of the procedure, he plans to have a friend drive him to the appointment.   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   10/3/2022: Patient reported no change in anxiety since last visit.   2023: Patient reported that anxiety symptoms remain in good control and he had a concussion a few weeks ago and brain imaging was negative but had post concussion symptoms which he reports are improving in the past couple of weeks.  2023:  Patient continues to remain stable and anxiety symptoms remain in good control  10/23/2023: Patient reports mild anxiety since last visit, not on gabapentin for past 6 weeks otherwise in general stable.  2024: The patient reported a slight increase in caregiver stress, and he is coping well and stable

## 2024-03-31 NOTE — PLAN
[FreeTextEntry5] : psychoeducation and supportive therapy provided and discussed rationale for recommended meds behavior activation and sleep hygiene  Continue Prozac 20 mg/day,  Resume gabapentin, start 300 mg at bedtime for 5 days and increase the dose gabapentin 600 mg HS.  Xanax 0.25 mg once daily as needed. He says he did not need it since last visit. Last Rx was in 2021.  Educated patient of importance of being and remaining abstinent from drugs and alcohol.  Emergency procedures were discussed: pt. educated to call 911 or go to nearest ER for worsening of symptoms/suicidal/homicidal ideation.  RTC in 3 months or earlier as needed.  Patient expressed understanding and agreement with above plan.   I stop checked, no controlled substance Rx in past 12 months: Reference #: [id number]

## 2024-04-19 ENCOUNTER — APPOINTMENT (OUTPATIENT)
Dept: PSYCHIATRY | Facility: CLINIC | Age: 59
End: 2024-04-19
Payer: MEDICARE

## 2024-04-19 PROCEDURE — 99214 OFFICE O/P EST MOD 30 MIN: CPT

## 2024-04-19 PROCEDURE — G2211 COMPLEX E/M VISIT ADD ON: CPT

## 2024-04-22 NOTE — PLAN
[FreeTextEntry5] : psychoeducation and supportive therapy provided and discussed rationale for recommended meds behavior activation and sleep hygiene  Continue Prozac 20 mg/day,  Increase gabapentin to 900 mg HS  Xanax 0.25 mg once daily as needed. He says he did not need it since last visit. Last Rx was in 2021. Patient advised to call office for Xanax Rx. Educated patient of importance of being and remaining abstinent from drugs and alcohol.  Emergency procedures were discussed: pt. educated to call 911 or go to nearest ER for worsening of symptoms/suicidal/homicidal ideation.  RTC in 4-6 weeks or earlier as needed.  Patient expressed understanding and agreement with above plan.   I stop checked, no controlled substance Rx in past 12 months: Reference #: [id number]

## 2024-04-22 NOTE — HISTORY OF PRESENT ILLNESS
[FreeTextEntry1] : The patient states he is having "a lot of problems".  Patient states that usually in spring fall and early winter he has vertigo and feels that his wife body is vibrating but if this only lasts for a few days but this time 2 weeks before Easter he started to experience vertigo which she describes is "sensation of movement and being off balance".  Patient states that this has continued for over a month and he is also starting to feel like his "whole body" is vibrating and he states that he is more aware of this when he is sitting still or laying in bed.  Patient states that he also has nausea off and on.  Patient reported that his sleep is affected and that sometimes he has trouble falling asleep and other times he is waking up several times in the middle of the night.  Patient reported that recently he had blood work done and his PSA was elevated and he had a biopsy of his prostrate which was normal.  Patient states he also had seen ENT physician for his vertigo but was told that there was no issues with his inner ear.  Patient states that he was supposed to get an MRI over a year ago but he felt extremely anxious and was not able to do that then.

## 2024-04-22 NOTE — DISCUSSION/SUMMARY
[FreeTextEntry1] : The patient is a 53-year-old male with anxiety disorder, likely generalized anxiety disorder and panic attacks.   10/16/2020: Patient's father  2 weeks ago, he is sad but states he is coping. he states he did not switch to Prozac as he was busy taking care of his father, and there is not change with increased sweating and poor heat tolerance and this is limiting his ability to exercise and not able to lose weight and wants to start now.   2020: Patient reports mild episodes of anxiety, overall stable, less tired with Prozac, will continue same does   12/15/2020: Patient reports mild, but persistent and frequent episodes of anxiety, no panic attacks, tolerating Prozac, will increase dose  2021: Patient reports about 10 days after increasing Prozac dose he is excessively tired in the day and has trouble waking up and is not productive, though states he is not depressed   3/10/2021: Patient reports since December he has been excessively tired in the day, no change with reducing Prozac dose, denies feeling depressed or anxious   2021: Patient reports excessive daytime fatigue significantly improved by changing the timing of Prozac. He denies feeling depressed or anxious.   2021: Patient reports no sx of anxiety or depression since last visit.   10/11/2021: Patient reports slight increase in anxiety recently because of the pain and also feeling that he is getting overwhelmed and feeling stuck in his life. Discussed possibility of increasing Prozac which we had done last year but he felt extremely fatigued at that time. Patient reports he does not want to make any changes at this time and would like to continue with the same dose of medications.  2021: Patient reports he continues to have random, brief moments of anxiety, but overall anxiety sx in good control, not interfering with his daily functioning.   3/7/2022: Patient states anxiety is in good control, not depressed. He states he needs to get an MRI for his ankle, and coordinating to get clearances for doing ti under anesthesia has been challenging and asks if he could take his Xanax or another med. States he took Xanax 2 tabs, last time, had minimal benefit, and could take up to 1 mg, half mg 1 hr before and 0.5 mg at the time of the procedure, he plans to have a friend drive him to the appointment.   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   10/3/2022: Patient reported no change in anxiety since last visit.   2023: Patient reported that anxiety symptoms remain in good control and he had a concussion a few weeks ago and brain imaging was negative but had post concussion symptoms which he reports are improving in the past couple of weeks.  2023:  Patient continues to remain stable and anxiety symptoms remain in good control  10/23/2023: Patient reports mild anxiety since last visit, not on gabapentin for past 6 weeks otherwise in general stable.  2024: The patient reported a slight increase in caregiver stress, and he is coping well and stable   2024: Patient is reporting increased anxiety related to recent experience with vertigo and feeling like his whole body is vibrating and so far evaluation by his PCP and ENT did not find any etiology to explain these physical symptoms, he had a neurological workup couple years ago when the MRI is pending.  Given that the patient is experiencing increased anxiety and trouble sleeping we will recommend increasing gabapentin dose for increased therapeutic benefit. Previously when fluoxetine dose was higher than 30 mg/day he felt more tired and preferred to lower the dose. Patient advised to call office for Xanax Rx.

## 2024-04-22 NOTE — REASON FOR VISIT
[Patient preference] : as per patient preference [Telehealth (audio & video) - Individual/Group] : This visit was provided via telehealth using real-time 2-way audio visual technology. [Medical Office: ([facility])___] : The provider was located at the medical office in [unfilled]. [Home] : The patient, [unfilled], was located at home, [unfilled], at the time of the visit. [Verbal consent obtained from patient/other participant(s)] : Verbal consent for telehealth/telephonic services obtained from patient/other participant(s) [FreeTextEntry1] : anxiety

## 2024-05-13 ENCOUNTER — APPOINTMENT (OUTPATIENT)
Dept: PSYCHIATRY | Facility: CLINIC | Age: 59
End: 2024-05-13
Payer: MEDICARE

## 2024-05-13 PROCEDURE — G2211 COMPLEX E/M VISIT ADD ON: CPT

## 2024-05-13 PROCEDURE — 99214 OFFICE O/P EST MOD 30 MIN: CPT

## 2024-05-13 RX ORDER — FLUOXETINE HYDROCHLORIDE 20 MG/1
20 CAPSULE ORAL
Qty: 90 | Refills: 0 | Status: DISCONTINUED | COMMUNITY
Start: 2020-08-17 | End: 2024-05-13

## 2024-05-17 NOTE — HISTORY OF PRESENT ILLNESS
[FreeTextEntry1] : The patient states he is continuing to feel anxious, reports his heart is racing. He states the increased dose of gabapentin is not helping him at all. He states he is feeling "vibrations" in his whole body. He has trouble falling and staying asleep. Appetite-reduced. For past week taking Xanax daily. Mood- "pissed that I'm stuck again". He states he is unable to leave the house again. He denied passive/active SI He denied hearing voices or noise.  He continues to have lower and upper back pain.  He denied ETOH and or illicit drug use.  Reviewed previous med trials: Duloxetine in 2017 before switching to Prozac and higher dose of Prozac made him feel lethargic. Other meds in the past: Lexapro- stopped being effective, Zoloft, risperidone, lithium. He thinks he may have been on Effexor but does not remember the effect and or duration.

## 2024-05-17 NOTE — PHYSICAL EXAM
[None] : none [Average] : average [Cooperative] : cooperative [Anxious] : anxious [Constricted] : constricted [Clear] : clear [Linear/Goal Directed] : linear/goal directed [None Reported] : none reported [WNL] : within normal limits [FreeTextEntry7] : No SI/HI

## 2024-05-17 NOTE — DISCUSSION/SUMMARY
[FreeTextEntry1] : The patient is a 53-year-old male with anxiety disorder, likely generalized anxiety disorder and panic attacks.   10/16/2020: Patient's father  2 weeks ago, he is sad but states he is coping. he states he did not switch to Prozac as he was busy taking care of his father, and there is not change with increased sweating and poor heat tolerance and this is limiting his ability to exercise and not able to lose weight and wants to start now.   2020: Patient reports mild episodes of anxiety, overall stable, less tired with Prozac, will continue same does   12/15/2020: Patient reports mild, but persistent and frequent episodes of anxiety, no panic attacks, tolerating Prozac, will increase dose  2021: Patient reports about 10 days after increasing Prozac dose he is excessively tired in the day and has trouble waking up and is not productive, though states he is not depressed   3/10/2021: Patient reports since December he has been excessively tired in the day, no change with reducing Prozac dose, denies feeling depressed or anxious   2021: Patient reports excessive daytime fatigue significantly improved by changing the timing of Prozac. He denies feeling depressed or anxious.   2021: Patient reports no sx of anxiety or depression since last visit.   10/11/2021: Patient reports slight increase in anxiety recently because of the pain and also feeling that he is getting overwhelmed and feeling stuck in his life. Discussed possibility of increasing Prozac which we had done last year but he felt extremely fatigued at that time. Patient reports he does not want to make any changes at this time and would like to continue with the same dose of medications.  2021: Patient reports he continues to have random, brief moments of anxiety, but overall anxiety sx in good control, not interfering with his daily functioning.   3/7/2022: Patient states anxiety is in good control, not depressed. He states he needs to get an MRI for his ankle, and coordinating to get clearances for doing ti under anesthesia has been challenging and asks if he could take his Xanax or another med. States he took Xanax 2 tabs, last time, had minimal benefit, and could take up to 1 mg, half mg 1 hr before and 0.5 mg at the time of the procedure, he plans to have a friend drive him to the appointment.   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   10/3/2022: Patient reported no change in anxiety since last visit.   2023: Patient reported that anxiety symptoms remain in good control and he had a concussion a few weeks ago and brain imaging was negative but had post concussion symptoms which he reports are improving in the past couple of weeks.  2023:  Patient continues to remain stable and anxiety symptoms remain in good control  10/23/2023: Patient reports mild anxiety since last visit, not on gabapentin for past 6 weeks otherwise in general stable.  2024: The patient reported a slight increase in caregiver stress, and he is coping well and stable   2024: Patient is reporting increased anxiety related to recent experience with vertigo and feeling like his whole body is vibrating and so far evaluation by his PCP and ENT did not find any etiology to explain these physical symptoms, he had a neurological workup couple years ago when the MRI is pending.  Given that the patient is experiencing increased anxiety and trouble sleeping we will recommend increasing gabapentin dose for increased therapeutic benefit. Previously when fluoxetine dose was higher than 30 mg/day he felt more tired and preferred to lower the dose. Patient advised to call office for Xanax Rx.  2024: The patient reported still feeling anxious and frustrated and increased dose of gabapentin is not effective.  Past med trials reviewed: Duloxetine in 2017 before switching to Prozac and higher dose of Prozac made him feel lethargic. Other meds in the past: Lexapro- stopped being effective, Zoloft, risperidone, lithium. He thinks he may have been on Effexor but does not remember the effect and or duration and agrees to a retrial.

## 2024-05-17 NOTE — REASON FOR VISIT
[Patient preference] : as per patient preference [Telehealth (audio & video) - Individual/Group] : This visit was provided via telehealth using real-time 2-way audio visual technology. [Medical Office: ([facility])___] : The provider was located at the medical office in [unfilled]. [Home] : The patient, [unfilled], was located at home, [unfilled], at the time of the visit. [Verbal consent obtained from patient/other participant(s)] : Verbal consent for telehealth/telephonic services obtained from patient/other participant(s) [Patient] : Patient [FreeTextEntry1] : anxiety

## 2024-05-17 NOTE — PLAN
[No] : No [Medication education provided] : Medication education provided. [Rationale for medication choices, possible risks/precautions, benefits, alternative treatment choices, and consequences of non-treatment discussed] : Rationale for medication choices, possible risks/precautions, benefits, alternative treatment choices, and consequences of non-treatment discussed with patient/family/caregiver  [FreeTextEntry5] : psychoeducation and supportive therapy provided and discussed rationale for recommended meds behavior activation and sleep hygiene  Stop Prozac 20 mg/day,  Start Effexor XR 37.5 mg/day for a week, then take 75 mg/day with food Discussed with patient venlafaxine side effects, including but not limited to GI side effects, elevated BP with doses more than 150 mg/day, sexual side effects and black box warning of increased SI in patient younger than 24 years of age.  Continue gabapentin 900 mg HS  Xanax 0.25 mg once daily as needed. He says he did not need it since last visit. Last Rx was in 2021. Patient advised to call office for Xanax Rx. Educated patient of importance of being and remaining abstinent from drugs and alcohol.  Emergency procedures were discussed: pt. educated to call 911 or go to nearest ER for worsening of symptoms/suicidal/homicidal ideation.  RTC in 4 weeks or earlier as needed.  Patient expressed understanding and agreement with above plan.   I stop checked: Reference #: [id number]  Practitioner Count: 1 Pharmacy Count: 1 Current Opioid Prescriptions: 0 Current Benzodiazepine Prescriptions: 1 Current Stimulant Prescriptions: 0   Patient Demographic Information (PDI)   PDI	First Name	Last Name	Birth Date	Gender	Street Address	[facility]	Zip Code [place]	John	Lombardo	1965	Male	[address]	[facility]	[address]  Prescription Information PDI	My Rx	Current Rx	Drug Type	Rx Written	Rx Dispensed	Drug	Quantity	Days Supply	Prescriber Name	Prescriber MAVIS #	Payment Method	Dispenser A	Y	Y	B	04/23/2024	04/24/2024	alprazolam 0.25 mg tablet	30	30	Kasey Gu	[id number]	Medicare	[id number]

## 2024-06-12 ENCOUNTER — APPOINTMENT (OUTPATIENT)
Dept: PSYCHIATRY | Facility: CLINIC | Age: 59
End: 2024-06-12
Payer: MEDICARE

## 2024-06-12 DIAGNOSIS — F41.9 ANXIETY DISORDER, UNSPECIFIED: ICD-10-CM

## 2024-06-12 PROCEDURE — G2211 COMPLEX E/M VISIT ADD ON: CPT

## 2024-06-12 PROCEDURE — 99214 OFFICE O/P EST MOD 30 MIN: CPT

## 2024-06-12 RX ORDER — ALPRAZOLAM 0.25 MG/1
0.25 TABLET ORAL
Qty: 30 | Refills: 0 | Status: ACTIVE | COMMUNITY
Start: 2017-06-08 | End: 1900-01-01

## 2024-06-12 RX ORDER — GABAPENTIN 600 MG/1
600 TABLET, COATED ORAL
Qty: 135 | Refills: 0 | Status: ACTIVE | COMMUNITY
Start: 2018-02-23 | End: 1900-01-01

## 2024-06-12 RX ORDER — VENLAFAXINE HYDROCHLORIDE 37.5 MG/1
37.5 CAPSULE, EXTENDED RELEASE ORAL
Qty: 60 | Refills: 0 | Status: ACTIVE | COMMUNITY
Start: 2024-05-13 | End: 1900-01-01

## 2024-06-16 NOTE — PHYSICAL EXAM
[None] : none [Average] : average [Cooperative] : cooperative [Anxious] : anxious [Constricted] : constricted [Clear] : clear [Linear/Goal Directed] : linear/goal directed [None Reported] : none reported [WNL] : within normal limits [Full] : full [FreeTextEntry8] : "better"  [FreeTextEntry7] : No SI/HI

## 2024-06-16 NOTE — DISCUSSION/SUMMARY
[FreeTextEntry1] : The patient is a 53-year-old male with anxiety disorder, likely generalized anxiety disorder and panic attacks.   10/16/2020: Patient's father  2 weeks ago, he is sad but states he is coping. he states he did not switch to Prozac as he was busy taking care of his father, and there is not change with increased sweating and poor heat tolerance and this is limiting his ability to exercise and not able to lose weight and wants to start now.   2020: Patient reports mild episodes of anxiety, overall stable, less tired with Prozac, will continue same does   12/15/2020: Patient reports mild, but persistent and frequent episodes of anxiety, no panic attacks, tolerating Prozac, will increase dose  2021: Patient reports about 10 days after increasing Prozac dose he is excessively tired in the day and has trouble waking up and is not productive, though states he is not depressed   3/10/2021: Patient reports since December he has been excessively tired in the day, no change with reducing Prozac dose, denies feeling depressed or anxious   2021: Patient reports excessive daytime fatigue significantly improved by changing the timing of Prozac. He denies feeling depressed or anxious.   2021: Patient reports no sx of anxiety or depression since last visit.   10/11/2021: Patient reports slight increase in anxiety recently because of the pain and also feeling that he is getting overwhelmed and feeling stuck in his life. Discussed possibility of increasing Prozac which we had done last year but he felt extremely fatigued at that time. Patient reports he does not want to make any changes at this time and would like to continue with the same dose of medications.  2021: Patient reports he continues to have random, brief moments of anxiety, but overall anxiety sx in good control, not interfering with his daily functioning.   3/7/2022: Patient states anxiety is in good control, not depressed. He states he needs to get an MRI for his ankle, and coordinating to get clearances for doing ti under anesthesia has been challenging and asks if he could take his Xanax or another med. States he took Xanax 2 tabs, last time, had minimal benefit, and could take up to 1 mg, half mg 1 hr before and 0.5 mg at the time of the procedure, he plans to have a friend drive him to the appointment.   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   10/3/2022: Patient reported no change in anxiety since last visit.   2023: Patient reported that anxiety symptoms remain in good control and he had a concussion a few weeks ago and brain imaging was negative but had post concussion symptoms which he reports are improving in the past couple of weeks.  2023:  Patient continues to remain stable and anxiety symptoms remain in good control  10/23/2023: Patient reports mild anxiety since last visit, not on gabapentin for past 6 weeks otherwise in general stable.  2024: The patient reported a slight increase in caregiver stress, and he is coping well and stable   2024: Patient is reporting increased anxiety related to recent experience with vertigo and feeling like his whole body is vibrating and so far evaluation by his PCP and ENT did not find any etiology to explain these physical symptoms, he had a neurological workup couple years ago when the MRI is pending.  Given that the patient is experiencing increased anxiety and trouble sleeping we will recommend increasing gabapentin dose for increased therapeutic benefit. Previously when fluoxetine dose was higher than 30 mg/day he felt more tired and preferred to lower the dose. Patient advised to call office for Xanax Rx.  2024: The patient reported still feeling anxious and frustrated and increased dose of gabapentin is not effective.  Past med trials reviewed: Duloxetine in 2017 before switching to Prozac and higher dose of Prozac made him feel lethargic. Other meds in the past: Lexapro- stopped being effective, Zoloft, risperidone, lithium. He thinks he may have been on Effexor but does not remember the effect and or duration and agrees to a retrial.   2024: The patient states anxiety symptoms are about 90% gone with switching to venlafaxine, however he started to experience blurred vision and that is not improving. He states he would like to switch to another med but would like to start tapering to be off meds before a test ENT specialist is recommending, and for which he states he needs to off meds, and instead of resuming venlafaxine then we will plan to start desvenlafaxine, for a better side effect profile and similar mechanism of action.

## 2024-06-16 NOTE — REASON FOR VISIT
[Patient preference] : as per patient preference [Telehealth (audio & video) - Individual/Group] : This visit was provided via telehealth using real-time 2-way audio visual technology. [Other Location: e.g. Home (Enter Location, City,State)___] : The provider was located at [unfilled]. [Home] : The patient, [unfilled], was located at home, [unfilled], at the time of the visit. [Verbal consent obtained from patient/other participant(s)] : Verbal consent for telehealth/telephonic services obtained from patient/other participant(s) [Patient] : Patient [FreeTextEntry1] : anxiety

## 2024-06-16 NOTE — PLAN
[No] : No [Medication education provided] : Medication education provided. [Rationale for medication choices, possible risks/precautions, benefits, alternative treatment choices, and consequences of non-treatment discussed] : Rationale for medication choices, possible risks/precautions, benefits, alternative treatment choices, and consequences of non-treatment discussed with patient/family/caregiver  [FreeTextEntry5] : psychoeducation and supportive therapy provided and discussed rationale for recommended meds behavior activation and sleep hygiene  When he knows the date of the ENT test, he can start tapering venlafaxine: by dropping the dose to 37.5 mg/day for 5 days then 37.5 mg every other day for 5 days then stop. And for gabapentin taper: lower to 600 mg HS for 5 days, then 300 mg HS for 5 days then stop. May use Xanax 0.25 mg BID for anxiety for the 2 days when he is med free before the test.  Until he has the confirmation of the ENT diagnostic test, he will continue venlafaxine XR 75 mg/day and gabapentin 900 mg HS  After completing ENT diagnostic test, he will call for Rx for desvenlafaxine and start desvenlafaxine XR 25 mg/day for 5 days then increase to 50 mg/day and for gabapentin start 300 mg HS for 3 days then 600 mg HS for 3 days then 900 mg HS.  Discussed with patient desvenlafaxine side effects, including but not limited to GI side effects, and black box warning of increased SI in patient younger than 24 years of age.  Xanax 0.25 mg once daily as needed.  Educated patient of importance of being and remaining abstinent from drugs and alcohol.  Emergency procedures were discussed: pt. educated to call 911 or go to nearest ER for worsening of symptoms/suicidal/homicidal ideation.  RTC in 4-6 weeks or earlier as needed.  Patient expressed understanding and agreement with above plan.   I stop checked: Reference #: [id number]  Practitioner Count: 1 Pharmacy Count: 1 Current Opioid Prescriptions: 0 Current Benzodiazepine Prescriptions: 0 Current Stimulant Prescriptions: 0   Patient Demographic Information (PDI)     PDI	First Name	Last Name	Birth Date	Gender	Street Address	[facility]	Zip Code [place]	John	Lombardo	1965	Male	[address]	[facility]	[address]  Prescription Information    PDI Filter:   PDI	My Rx	Current Rx	Drug Type	Rx Written	Rx Dispensed	Drug	Quantity	Days Supply	Prescriber Name	Prescriber MAVIS #	Payment Method	Dispenser A	Y	N	B	04/23/2024	04/24/2024	alprazolam 0.25 mg tablet	30	30	Kasey Gu	[id number]	Medicare	[id number]

## 2024-06-16 NOTE — HISTORY OF PRESENT ILLNESS
[FreeTextEntry1] : Med changes made on last visit: Stop Prozac 20 mg/day,  Start Effexor XR 37.5 mg/day for a week, then take 75 mg/day with food Patient states, "anxiety is 90 % gone" and he is not experiencing body vibrations. He states appetite is better and though sleep is still interrupted, it is only 1-2 times, and he is able to fall back to sleep.  But he is having blurred vision for about 30 to 60 min after he takes venlafaxine, and again comes and goes, though does not persist continually.  He states he still has dizziness and ENT specialist recommended a tilt test, and before that they want him to be off meds, including his psychotropic meds. He states he does not have a date for the test, but he would like to taper and come off venlafaxine, but after the test prefers to go on a different med because the blurred vision that started with venlafaxine is not improving.  He denied passive/active SI He denied hearing voices or noise.  He continues to have lower and upper back pain.  Adherence to med regimen: Good  Reviewed previous med trials: Duloxetine in 2017 before switching to Prozac and higher dose of Prozac made him feel lethargic. Other meds in the past: Lexapro- stopped being effective, Zoloft, risperidone, lithium. He thinks he may have been on Effexor but does not remember the effect and or duration.

## 2024-07-09 ENCOUNTER — RX RENEWAL (OUTPATIENT)
Age: 59
End: 2024-07-09

## 2024-07-16 RX ORDER — DESVENLAFAXINE 25 MG/1
25 TABLET, EXTENDED RELEASE ORAL
Qty: 60 | Refills: 0 | Status: ACTIVE | COMMUNITY
Start: 2024-07-16 | End: 1900-01-01

## 2024-08-12 ENCOUNTER — APPOINTMENT (OUTPATIENT)
Dept: PSYCHIATRY | Facility: CLINIC | Age: 59
End: 2024-08-12
Payer: MEDICARE

## 2024-08-12 DIAGNOSIS — F41.9 ANXIETY DISORDER, UNSPECIFIED: ICD-10-CM

## 2024-08-12 PROCEDURE — G2211 COMPLEX E/M VISIT ADD ON: CPT

## 2024-08-12 PROCEDURE — 99213 OFFICE O/P EST LOW 20 MIN: CPT

## 2024-08-15 NOTE — PHYSICAL EXAM
[None] : none [Average] : average [Cooperative] : cooperative [Full] : full [Clear] : clear [Linear/Goal Directed] : linear/goal directed [None Reported] : none reported [WNL] : within normal limits [FreeTextEntry8] : "okay"  [de-identified] : slightly constricted [FreeTextEntry7] : No SI/HI

## 2024-08-15 NOTE — PLAN
[No] : No [Medication education provided] : Medication education provided. [Rationale for medication choices, possible risks/precautions, benefits, alternative treatment choices, and consequences of non-treatment discussed] : Rationale for medication choices, possible risks/precautions, benefits, alternative treatment choices, and consequences of non-treatment discussed with patient/family/caregiver  [FreeTextEntry5] : psychoeducation and supportive therapy provided and discussed rationale for recommended meds, and patient prefers to defer daily meds for anxiety and wants to wait till he can assess the response with meds rxed for possible Menier's disease  behavior activation and sleep hygiene  Xanax 0.25 mg once daily as needed.  Educated patient of importance of being and remaining abstinent from drugs and alcohol.  Emergency procedures were discussed: pt. educated to call 911 or go to nearest ER for worsening of symptoms/suicidal/homicidal ideation.  RTC in 4-6 weeks or earlier as needed.  Patient expressed understanding and agreement with above plan.   I stop checked: Reference #: [id number]  Practitioner Count: 1 Pharmacy Count: 1 Current Opioid Prescriptions: 0 Current Benzodiazepine Prescriptions: 0 Current Stimulant Prescriptions: 0   Patient Demographic Information (PDI)     PDI	First Name	Last Name	Birth Date	Gender	Street Address	[facility]	Zip Code [place]	John	Lombardo	1965	Male	[address]	[facility]	[address]  Prescription Information    PDI Filter:   PDI	My Rx	Current Rx	Drug Type	Rx Written	Rx Dispensed	Drug	Quantity	Days Supply	Prescriber Name	Prescriber MAVIS #	Payment Method	Dispenser A	Y	N	B	06/12/2024	06/13/2024	alprazolam 0.25 mg tablet	30	30	Kasey Gu	[id number]	Medicare	[id number] A	Y	N	B	04/23/2024	04/24/2024	alprazolam 0.25 mg tablet	30	30	Kasey Gu	[id number]	Medicare	[id number]

## 2024-08-15 NOTE — PLAN
[No] : No [Medication education provided] : Medication education provided. [Rationale for medication choices, possible risks/precautions, benefits, alternative treatment choices, and consequences of non-treatment discussed] : Rationale for medication choices, possible risks/precautions, benefits, alternative treatment choices, and consequences of non-treatment discussed with patient/family/caregiver  [FreeTextEntry5] : psychoeducation and supportive therapy provided and discussed rationale for recommended meds, and patient prefers to defer daily meds for anxiety and wants to wait till he can assess the response with meds rxed for possible Menier's disease  behavior activation and sleep hygiene  Xanax 0.25 mg once daily as needed.  Educated patient of importance of being and remaining abstinent from drugs and alcohol.  Emergency procedures were discussed: pt. educated to call 911 or go to nearest ER for worsening of symptoms/suicidal/homicidal ideation.  RTC in 4-6 weeks or earlier as needed.  Patient expressed understanding and agreement with above plan.   I stop checked: Reference #: [id number]  Practitioner Count: 1 Pharmacy Count: 1 Current Opioid Prescriptions: 0 Current Benzodiazepine Prescriptions: 0 Current Stimulant Prescriptions: 0   Patient Demographic Information (PDI)     PDI	First Name	Last Name	Birth Date	Gender	Street Address	[facility]	Zip Code [place]	John	Lombardo	1965	Male	[address]	[facility]	[address]  Prescription Information    PDI Filter:   PDI	My Rx	Current Rx	Drug Type	Rx Written	Rx Dispensed	Drug	Quantity	Days Supply	Prescriber Name	Prescriber MAVIS #	Payment Method	Dispenser A	Y	N	B	06/12/2024	06/13/2024	alprazolam 0.25 mg tablet	30	30	Kasey Gu	[id number]	Medicare	[id number] A	Y	N	B	04/23/2024	04/24/2024	alprazolam 0.25 mg tablet	30	30	Kasey Gu	[id number]	Medicare	[id number]

## 2024-08-15 NOTE — DISCUSSION/SUMMARY
[FreeTextEntry1] : The patient is a 53-year-old male with anxiety disorder, likely generalized anxiety disorder and panic attacks.   10/16/2020: Patient's father  2 weeks ago, he is sad but states he is coping. he states he did not switch to Prozac as he was busy taking care of his father, and there is not change with increased sweating and poor heat tolerance and this is limiting his ability to exercise and not able to lose weight and wants to start now.   2020: Patient reports mild episodes of anxiety, overall stable, less tired with Prozac, will continue same does   12/15/2020: Patient reports mild, but persistent and frequent episodes of anxiety, no panic attacks, tolerating Prozac, will increase dose  2021: Patient reports about 10 days after increasing Prozac dose he is excessively tired in the day and has trouble waking up and is not productive, though states he is not depressed   3/10/2021: Patient reports since December he has been excessively tired in the day, no change with reducing Prozac dose, denies feeling depressed or anxious   2021: Patient reports excessive daytime fatigue significantly improved by changing the timing of Prozac. He denies feeling depressed or anxious.   2021: Patient reports no sx of anxiety or depression since last visit.   10/11/2021: Patient reports slight increase in anxiety recently because of the pain and also feeling that he is getting overwhelmed and feeling stuck in his life. Discussed possibility of increasing Prozac which we had done last year but he felt extremely fatigued at that time. Patient reports he does not want to make any changes at this time and would like to continue with the same dose of medications.  2021: Patient reports he continues to have random, brief moments of anxiety, but overall anxiety sx in good control, not interfering with his daily functioning.   3/7/2022: Patient states anxiety is in good control, not depressed. He states he needs to get an MRI for his ankle, and coordinating to get clearances for doing ti under anesthesia has been challenging and asks if he could take his Xanax or another med. States he took Xanax 2 tabs, last time, had minimal benefit, and could take up to 1 mg, half mg 1 hr before and 0.5 mg at the time of the procedure, he plans to have a friend drive him to the appointment.   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   10/3/2022: Patient reported no change in anxiety since last visit.   2023: Patient reported that anxiety symptoms remain in good control and he had a concussion a few weeks ago and brain imaging was negative but had post concussion symptoms which he reports are improving in the past couple of weeks.  2023:  Patient continues to remain stable and anxiety symptoms remain in good control  10/23/2023: Patient reports mild anxiety since last visit, not on gabapentin for past 6 weeks otherwise in general stable.  2024: The patient reported a slight increase in caregiver stress, and he is coping well and stable   2024: Patient is reporting increased anxiety related to recent experience with vertigo and feeling like his whole body is vibrating and so far evaluation by his PCP and ENT did not find any etiology to explain these physical symptoms, he had a neurological workup couple years ago when the MRI is pending.  Given that the patient is experiencing increased anxiety and trouble sleeping we will recommend increasing gabapentin dose for increased therapeutic benefit. Previously when fluoxetine dose was higher than 30 mg/day he felt more tired and preferred to lower the dose. Patient advised to call office for Xanax Rx.  2024: The patient reported still feeling anxious and frustrated and increased dose of gabapentin is not effective.  Past med trials reviewed: Duloxetine in 2017 before switching to Prozac and higher dose of Prozac made him feel lethargic. Other meds in the past: Lexapro- stopped being effective, Zoloft, risperidone, lithium. He thinks he may have been on Effexor but does not remember the effect and or duration and agrees to a retrial.   2024: The patient states anxiety symptoms are about 90% gone with switching to venlafaxine, however he started to experience blurred vision and that is not improving. He states he would like to switch to another med but would like to start tapering to be off meds before a test ENT specialist is recommending, and for which he states he needs to off meds, and instead of resuming venlafaxine then we will plan to start desvenlafaxine, for a better side effect profile and similar mechanism of action.   2024: Patient reports that he is experiencing withdrawal symptoms from venlafaxine but did not yet start Pristiq because it was an issue with the prescription and he feels that the anxiety is related to having vertigo or other physical symptoms and at this point he feels that the withdrawal from venlafaxine is mild and he can tolerate as there has been improvement day by day and he does not want to start Pristiq because he wants to try the new compound medication for vertigo and he feels that if the vertigo is better his mood and anxiety symptoms will be much better controlled as well.

## 2024-08-15 NOTE — PLAN
[No] : No [Medication education provided] : Medication education provided. [Rationale for medication choices, possible risks/precautions, benefits, alternative treatment choices, and consequences of non-treatment discussed] : Rationale for medication choices, possible risks/precautions, benefits, alternative treatment choices, and consequences of non-treatment discussed with patient/family/caregiver  [FreeTextEntry5] : psychoeducation and supportive therapy provided and discussed rationale for recommended meds, and patient prefers to defer daily meds for anxiety and wants to wait till he can assess the response with meds rxed for possible Menier's disease  behavior activation and sleep hygiene  Xanax 0.25 mg once daily as needed.  Educated patient of importance of being and remaining abstinent from drugs and alcohol.  Emergency procedures were discussed: pt. educated to call 911 or go to nearest ER for worsening of symptoms/suicidal/homicidal ideation.  RTC in 4-6 weeks or earlier as needed.  Patient expressed understanding and agreement with above plan.   I stop checked: Reference #: [id number]  Practitioner Count: 1 Pharmacy Count: 1 Current Opioid Prescriptions: 0 Current Benzodiazepine Prescriptions: 0 Current Stimulant Prescriptions: 0   Patient Demographic Information (PDI)     PDI	First Name	Last Name	Birth Date	Gender	Street Address	[facility]	Zip Code [place]	John	Lombardo	1965	Male	[address]	[facility]	[address]  Prescription Information    PDI Filter:   PDI	My Rx	Current Rx	Drug Type	Rx Written	Rx Dispensed	Drug	Quantity	Days Supply	Prescriber Name	Prescriber MAVIS #	Payment Method	Dispenser A	Y	N	B	06/12/2024	06/13/2024	alprazolam 0.25 mg tablet	30	30	Kasey Gu	[id number]	Medicare	[id number] A	Y	N	B	04/23/2024	04/24/2024	alprazolam 0.25 mg tablet	30	30	Kasey Gu	[id number]	Medicare	[id number]

## 2024-08-15 NOTE — HISTORY OF PRESENT ILLNESS
[FreeTextEntry1] : Med changes made on last visit: taper and DC Effexor XR and gabapentin before ENT test, start desvenlafaxine 25 mg/day then increase to 50 mg/day  Patient reported that he understands the reason why he needed to taper venlafaxine in a short duration of time and he has been experiencing "brain zaps, feeling vibrations" in his body and having nausea, tremors and dizziness.  Patient reported that he did not start taking Pristiq because he was told by the pharmacy that could not be filled and he is not sure of the reasons.  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  Patient states that he was prescribed a medication that needed to be compounded as it is not available in the [place].  Patient reported that he is waiting for the new compounded medication to arrive. Patient reports that he feels anxious when he has vertigo and when vertigo is worse he is unable to drive.  Patient states that he is still having vertigo/dizziness which he states is mild and less frequent.  Patient reported that he is able to sleep about 5 to 6 hours and his appetite varies and is feeling nauseous he is eating less. He denied passive/active SI He denied hearing voices or noise.  He continues to have lower and upper back pain.  Adherence to med regimen: Good  Reviewed previous med trials: Duloxetine in 2017 before switching to Prozac and higher dose of Prozac made him feel lethargic. Other meds in the past: Lexapro- stopped being effective, Zoloft, risperidone, lithium. He thinks he may have been on Effexor but does not remember the effect and or duration.

## 2024-08-15 NOTE — PLAN
[No] : No [Medication education provided] : Medication education provided. [Rationale for medication choices, possible risks/precautions, benefits, alternative treatment choices, and consequences of non-treatment discussed] : Rationale for medication choices, possible risks/precautions, benefits, alternative treatment choices, and consequences of non-treatment discussed with patient/family/caregiver  [FreeTextEntry5] : psychoeducation and supportive therapy provided and discussed rationale for recommended meds, and patient prefers to defer daily meds for anxiety and wants to wait till he can assess the response with meds rxed for possible Menier's disease  behavior activation and sleep hygiene  Xanax 0.25 mg once daily as needed.  Educated patient of importance of being and remaining abstinent from drugs and alcohol.  Emergency procedures were discussed: pt. educated to call 911 or go to nearest ER for worsening of symptoms/suicidal/homicidal ideation.  RTC in 4-6 weeks or earlier as needed.  Patient expressed understanding and agreement with above plan.   I stop checked: Reference #: [id number]  Practitioner Count: 1 Pharmacy Count: 1 Current Opioid Prescriptions: 0 Current Benzodiazepine Prescriptions: 0 Current Stimulant Prescriptions: 0   Patient Demographic Information (PDI)     PDI	First Name	Last Name	Birth Date	Gender	Street Address	[facility]	Zip Code [place]	John	Lombardo	1965	Male	[address]	[facility]	[address]  Prescription Information    PDI Filter:   PDI	My Rx	Current Rx	Drug Type	Rx Written	Rx Dispensed	Drug	Quantity	Days Supply	Prescriber Name	Prescriber MAVIS #	Payment Method	Dispenser A	Y	N	B	06/12/2024	06/13/2024	alprazolam 0.25 mg tablet	30	30	Kasey Gu	[id number]	Medicare	[id number] A	Y	N	B	04/23/2024	04/24/2024	alprazolam 0.25 mg tablet	30	30	Kasey Gu	[id number]	Medicare	[id number]

## 2024-08-15 NOTE — PHYSICAL EXAM
[None] : none [Average] : average [Cooperative] : cooperative [Full] : full [Clear] : clear [Linear/Goal Directed] : linear/goal directed [None Reported] : none reported [WNL] : within normal limits [FreeTextEntry8] : "okay"  [de-identified] : slightly constricted [FreeTextEntry7] : No SI/HI

## 2024-08-15 NOTE — PHYSICAL EXAM
[None] : none [Average] : average [Cooperative] : cooperative [Full] : full [Clear] : clear [Linear/Goal Directed] : linear/goal directed [None Reported] : none reported [WNL] : within normal limits [FreeTextEntry8] : "okay"  [de-identified] : slightly constricted [FreeTextEntry7] : No SI/HI

## 2024-08-15 NOTE — PHYSICAL EXAM
[None] : none [Average] : average [Cooperative] : cooperative [Full] : full [Clear] : clear [Linear/Goal Directed] : linear/goal directed [None Reported] : none reported [WNL] : within normal limits [FreeTextEntry8] : "okay"  [de-identified] : slightly constricted [FreeTextEntry7] : No SI/HI

## 2024-08-28 ENCOUNTER — OUTPATIENT (OUTPATIENT)
Dept: OUTPATIENT SERVICES | Facility: HOSPITAL | Age: 59
LOS: 1 days | Discharge: ANOTHER HOSPITAL | End: 2024-08-28
Payer: MEDICARE

## 2024-08-30 ENCOUNTER — APPOINTMENT (OUTPATIENT)
Dept: PSYCHIATRY | Facility: CLINIC | Age: 59
End: 2024-08-30
Payer: MEDICARE

## 2024-08-30 DIAGNOSIS — F41.9 ANXIETY DISORDER, UNSPECIFIED: ICD-10-CM

## 2024-08-30 PROCEDURE — 99214 OFFICE O/P EST MOD 30 MIN: CPT

## 2024-08-30 RX ORDER — ESCITALOPRAM OXALATE 10 MG/1
10 TABLET ORAL DAILY
Qty: 30 | Refills: 0 | Status: ACTIVE | COMMUNITY
Start: 2024-08-30 | End: 1900-01-01

## 2024-09-02 NOTE — PHYSICAL EXAM
[None] : none [Average] : average [Cooperative] : cooperative [Clear] : clear [Linear/Goal Directed] : linear/goal directed [None Reported] : none reported [WNL] : within normal limits [FreeTextEntry8] : "okay"  [Anxious] : anxious [de-identified] : slightly constricted [FreeTextEntry7] : No SI/HI

## 2024-09-02 NOTE — PHYSICAL EXAM
[None] : none [Average] : average [Cooperative] : cooperative [Clear] : clear [Linear/Goal Directed] : linear/goal directed [None Reported] : none reported [WNL] : within normal limits [FreeTextEntry8] : "okay"  [Anxious] : anxious [de-identified] : slightly constricted [FreeTextEntry7] : No SI/HI

## 2024-09-02 NOTE — REASON FOR VISIT
[Patient preference] : as per patient preference [Telehealth (audio & video) - Individual/Group] : This visit was provided via telehealth using real-time 2-way audio visual technology. [Other Location: e.g. Home (Enter Location, City,State)___] : The provider was located at [unfilled]. [Home] : The patient, [unfilled], was located at home, [unfilled], at the time of the visit. [Verbal consent obtained from patient/other participant(s)] : Verbal consent for telehealth/telephonic services obtained from patient/other participant(s) [Patient] : Patient [Telephone (audio) - Individual/Group] : This telephonic visit was provided via audio only technology. [Technical or other issues] : Patient unable to effectively utilize tele-video due to technical or other issues. [Medical Office: ([facility])___] : The provider was located at the medical office in [unfilled]. [FreeTextEntry1] : anxiety

## 2024-09-02 NOTE — PHYSICAL EXAM
[None] : none [Average] : average [Cooperative] : cooperative [Clear] : clear [Linear/Goal Directed] : linear/goal directed [None Reported] : none reported [WNL] : within normal limits [FreeTextEntry8] : "okay"  [Anxious] : anxious [de-identified] : slightly constricted [FreeTextEntry7] : No SI/HI

## 2024-09-02 NOTE — PLAN
[No] : No [Medication education provided] : Medication education provided. [Rationale for medication choices, possible risks/precautions, benefits, alternative treatment choices, and consequences of non-treatment discussed] : Rationale for medication choices, possible risks/precautions, benefits, alternative treatment choices, and consequences of non-treatment discussed with patient/family/caregiver  [FreeTextEntry5] : psychoeducation and supportive therapy provided and discussed rationale for med change considered Start Lexapro 5 mg/day for 4 days then increase to 10 mg/day Xanax 0.25 mg once daily as needed.  Educated patient of importance of being and remaining abstinent from drugs and alcohol.  Emergency procedures were discussed: pt. educated to call 911 or go to nearest ER for worsening of symptoms/suicidal/homicidal ideation.  RTC in 3 weeks or earlier as needed.  Patient expressed understanding and agreement with above plan.   I stop checked: Reference #: [id number]  Practitioner Count: 1 Pharmacy Count: 1 Current Opioid Prescriptions: 0 Current Benzodiazepine Prescriptions: 0 Current Stimulant Prescriptions: 0   Patient Demographic Information (PDI)     PDI	First Name	Last Name	Birth Date	Gender	Street Address	[facility]	Zip Code [place]	John	Lombardo	1965	Male	[address]	[facility]	[address]  Prescription Information    PDI Filter:   PDI	My Rx	Current Rx	Drug Type	Rx Written	Rx Dispensed	Drug	Quantity	Days Supply	Prescriber Name	Prescriber MAVIS #	Payment Method	Dispenser A	Y	N	B	06/12/2024	06/13/2024	alprazolam 0.25 mg tablet	30	30	Kasey Gu	[id number]	Medicare	[id number] A	Y	N	B	04/23/2024	04/24/2024	alprazolam 0.25 mg tablet	30	30	Kasey Gu	[id number]	Medicare	[id number]  I spent a total of 20 minutes for today's visit in evaluating and treating the patient as per above, including: preparing for patient's appointment (review of prior documents, [facility] ), performing a medically necessary exam/evaluation, communicating/counseling/educating the patient ordering medications, documenting clinical information in the EHR

## 2024-09-02 NOTE — PHYSICAL EXAM
[None] : none [Average] : average [Cooperative] : cooperative [Clear] : clear [Linear/Goal Directed] : linear/goal directed [None Reported] : none reported [WNL] : within normal limits [FreeTextEntry8] : "okay"  [Anxious] : anxious [de-identified] : slightly constricted [FreeTextEntry7] : No SI/HI

## 2024-09-02 NOTE — DISCUSSION/SUMMARY
[FreeTextEntry1] : The patient is a 53-year-old male with anxiety disorder, likely generalized anxiety disorder and panic attacks.   10/16/2020: Patient's father  2 weeks ago, he is sad but states he is coping. he states he did not switch to Prozac as he was busy taking care of his father, and there is not change with increased sweating and poor heat tolerance and this is limiting his ability to exercise and not able to lose weight and wants to start now.   2020: Patient reports mild episodes of anxiety, overall stable, less tired with Prozac, will continue same does   12/15/2020: Patient reports mild, but persistent and frequent episodes of anxiety, no panic attacks, tolerating Prozac, will increase dose  2021: Patient reports about 10 days after increasing Prozac dose he is excessively tired in the day and has trouble waking up and is not productive, though states he is not depressed   3/10/2021: Patient reports since December he has been excessively tired in the day, no change with reducing Prozac dose, denies feeling depressed or anxious   2021: Patient reports excessive daytime fatigue significantly improved by changing the timing of Prozac. He denies feeling depressed or anxious.   2021: Patient reports no sx of anxiety or depression since last visit.   10/11/2021: Patient reports slight increase in anxiety recently because of the pain and also feeling that he is getting overwhelmed and feeling stuck in his life. Discussed possibility of increasing Prozac which we had done last year but he felt extremely fatigued at that time. Patient reports he does not want to make any changes at this time and would like to continue with the same dose of medications.  2021: Patient reports he continues to have random, brief moments of anxiety, but overall anxiety sx in good control, not interfering with his daily functioning.   3/7/2022: Patient states anxiety is in good control, not depressed. He states he needs to get an MRI for his ankle, and coordinating to get clearances for doing ti under anesthesia has been challenging and asks if he could take his Xanax or another med. States he took Xanax 2 tabs, last time, had minimal benefit, and could take up to 1 mg, half mg 1 hr before and 0.5 mg at the time of the procedure, he plans to have a friend drive him to the appointment.   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   10/3/2022: Patient reported no change in anxiety since last visit.   2023: Patient reported that anxiety symptoms remain in good control and he had a concussion a few weeks ago and brain imaging was negative but had post concussion symptoms which he reports are improving in the past couple of weeks.  2023:  Patient continues to remain stable and anxiety symptoms remain in good control  10/23/2023: Patient reports mild anxiety since last visit, not on gabapentin for past 6 weeks otherwise in general stable.  2024: The patient reported a slight increase in caregiver stress, and he is coping well and stable   2024: Patient is reporting increased anxiety related to recent experience with vertigo and feeling like his whole body is vibrating and so far evaluation by his PCP and ENT did not find any etiology to explain these physical symptoms, he had a neurological workup couple years ago when the MRI is pending.  Given that the patient is experiencing increased anxiety and trouble sleeping we will recommend increasing gabapentin dose for increased therapeutic benefit. Previously when fluoxetine dose was higher than 30 mg/day he felt more tired and preferred to lower the dose. Patient advised to call office for Xanax Rx.  2024: The patient reported still feeling anxious and frustrated and increased dose of gabapentin is not effective.  Past med trials reviewed: Duloxetine in 2017 before switching to Prozac and higher dose of Prozac made him feel lethargic. Other meds in the past: Lexapro- stopped being effective, Zoloft, risperidone, lithium. He thinks he may have been on Effexor but does not remember the effect and or duration and agrees to a retrial.   2024: The patient states anxiety symptoms are about 90% gone with switching to venlafaxine, however he started to experience blurred vision and that is not improving. He states he would like to switch to another med but would like to start tapering to be off meds before a test ENT specialist is recommending, and for which he states he needs to off meds, and instead of resuming venlafaxine then we will plan to start desvenlafaxine, for a better side effect profile and similar mechanism of action.   2024: Patient reports that he is experiencing withdrawal symptoms from venlafaxine but did not yet start Pristiq because it was an issue with the prescription and he feels that the anxiety is related to having vertigo or other physical symptoms and at this point he feels that the withdrawal from venlafaxine is mild and he can tolerate as there has been improvement day by day and he does not want to start Pristiq because he wants to try the new compound medication for vertigo and he feels that if the vertigo is better his mood and anxiety symptoms will be much better controlled as well.  2024: Patient states he is continuing to experience discontinuation withdrawal symptoms from venlafaxine and the new compound drug he was rxde is helping with vertigo and his vision is better and would like to go back on Lexapro which he took previously, had no side effects as he is concerned about increased adverse effects with trying a new medication.

## 2024-09-02 NOTE — REASON FOR VISIT
[Patient preference] : as per patient preference [Telehealth (audio & video) - Individual/Group] : This visit was provided via telehealth using real-time 2-way audio visual technology. [Other Location: e.g. Home (Enter Location, City,State)___] : The provider was located at [unfilled]. [Home] : The patient, [unfilled], was located at home, [unfilled], at the time of the visit. [Verbal consent obtained from patient/other participant(s)] : Verbal consent for telehealth/telephonic services obtained from patient/other participant(s) [Patient] : Patient [Telephone (audio) - Individual/Group] : This telephonic visit was provided via audio only technology. [Technical or other issues] : Patient unable to effectively utilize tele-video due to technical or other issues. [Medical Office: ([facility])___] : The provider was located at the medical office in [unfilled]. [FreeTextEntry1] : anxiety

## 2024-09-02 NOTE — REASON FOR VISIT
[Patient preference] : as per patient preference [Telehealth (audio & video) - Individual/Group] : This visit was provided via telehealth using real-time 2-way audio visual technology. [Other Location: e.g. Home (Enter Location, City,State)___] : The provider was located at [unfilled]. [Home] : The patient, [unfilled], was located at home, [unfilled], at the time of the visit. [Verbal consent obtained from patient/other participant(s)] : Verbal consent for telehealth/telephonic services obtained from patient/other participant(s) [Patient] : Patient [Telephone (audio) - Individual/Group] : This telephonic visit was provided via audio only technology. [Technical or other issues] : Patient unable to effectively utilize tele-video due to technical or other issues. [Medical Office: ([facility])___] : The provider was located at the medical office in [unfilled]. [FreeTextEntry1] : anxiety

## 2024-09-02 NOTE — PLAN
[No] : No [Medication education provided] : Medication education provided. [Rationale for medication choices, possible risks/precautions, benefits, alternative treatment choices, and consequences of non-treatment discussed] : Rationale for medication choices, possible risks/precautions, benefits, alternative treatment choices, and consequences of non-treatment discussed with patient/family/caregiver  [FreeTextEntry5] : psychoeducation and supportive therapy provided and discussed rationale for med change considered Start Lexapro 5 mg/day for 4 days then increase to 10 mg/day Xanax 0.25 mg once daily as needed.  Educated patient of importance of being and remaining abstinent from drugs and alcohol.  Emergency procedures were discussed: pt. educated to call 911 or go to nearest ER for worsening of symptoms/suicidal/homicidal ideation.  RTC in 3 weeks or earlier as needed.  Patient expressed understanding and agreement with above plan.   I stop checked: Reference #: [id number]  Practitioner Count: 1 Pharmacy Count: 1 Current Opioid Prescriptions: 0 Current Benzodiazepine Prescriptions: 0 Current Stimulant Prescriptions: 0   Patient Demographic Information (PDI)     PDI	First Name	Last Name	Birth Date	Gender	Street Address	[facility]	Zip Code A	John	Lombardo	1965	Male	[address]	[facility]	[id number]  Prescription Information    PDI Filter:   PDI	My Rx	Current Rx	Drug Type	Rx Written	Rx Dispensed	Drug	Quantity	Days Supply	Prescriber Name	Prescriber MAVIS #	Payment Method	Dispenser A	Y	N	B	06/12/2024	06/13/2024	alprazolam 0.25 mg tablet	30	30	Kasey Gu	[id number]	Medicare	[id number] A	Y	N	B	04/23/2024	04/24/2024	alprazolam 0.25 mg tablet	30	30	Kasey Gu	[id number]	Medicare	[id number]  I spent a total of 20 minutes for today's visit in evaluating and treating the patient as per above, including: preparing for patient's appointment (review of prior documents, [facility] ), performing a medically necessary exam/evaluation, communicating/counseling/educating the patient ordering medications, documenting clinical information in the EHR

## 2024-09-02 NOTE — REASON FOR VISIT
[Patient preference] : as per patient preference [Telehealth (audio & video) - Individual/Group] : This visit was provided via telehealth using real-time 2-way audio visual technology. [Other Location: e.g. Home (Enter Location, City,State)___] : The provider was located at [unfilled]. [Home] : The patient, [unfilled], was located at home, [unfilled], at the time of the visit. [Verbal consent obtained from patient/other participant(s)] : Verbal consent for telehealth/telephonic services obtained from patient/other participant(s) [Patient] : Patient [Telephone (audio) - Individual/Group] : This telephonic visit was provided via audio only technology. [Technical or other issues] : Patient unable to effectively utilize tele-video due to technical or other issues. [Medical Office: ([facility])___] : The provider was located at the medical office in [unfilled]. [FreeTextEntry1] : anxiety

## 2024-09-02 NOTE — PLAN
[No] : No [Medication education provided] : Medication education provided. [Rationale for medication choices, possible risks/precautions, benefits, alternative treatment choices, and consequences of non-treatment discussed] : Rationale for medication choices, possible risks/precautions, benefits, alternative treatment choices, and consequences of non-treatment discussed with patient/family/caregiver  [FreeTextEntry5] : psychoeducation and supportive therapy provided and discussed rationale for med change considered Start Lexapro 5 mg/day for 4 days then increase to 10 mg/day Xanax 0.25 mg once daily as needed.  Educated patient of importance of being and remaining abstinent from drugs and alcohol.  Emergency procedures were discussed: pt. educated to call 911 or go to nearest ER for worsening of symptoms/suicidal/homicidal ideation.  RTC in 3 weeks or earlier as needed.  Patient expressed understanding and agreement with above plan.   I stop checked: Reference #: [id number]  Practitioner Count: 1 Pharmacy Count: 1 Current Opioid Prescriptions: 0 Current Benzodiazepine Prescriptions: 0 Current Stimulant Prescriptions: 0   Patient Demographic Information (PDI)     PDI	First Name	Last Name	Birth Date	Gender	Street Address	[facility]	Zip Code A	John	Lombardo	1965	Male	[address]	[facility]	[address]  Prescription Information    PDI Filter:   PDI	My Rx	Current Rx	Drug Type	Rx Written	Rx Dispensed	Drug	Quantity	Days Supply	Prescriber Name	Prescriber MAVIS #	Payment Method	Dispenser A	Y	N	B	06/12/2024	06/13/2024	alprazolam 0.25 mg tablet	30	30	Kasey Gu	[id number]	Medicare	[id number] A	Y	N	B	04/23/2024	04/24/2024	alprazolam 0.25 mg tablet	30	30	Kasey Gu	[id number]	Medicare	[id number]  I spent a total of 20 minutes for today's visit in evaluating and treating the patient as per above, including: preparing for patient's appointment (review of prior documents, [facility] ), performing a medically necessary exam/evaluation, communicating/counseling/educating the patient ordering medications, documenting clinical information in the EHR

## 2024-09-02 NOTE — PLAN
[No] : No [Medication education provided] : Medication education provided. [Rationale for medication choices, possible risks/precautions, benefits, alternative treatment choices, and consequences of non-treatment discussed] : Rationale for medication choices, possible risks/precautions, benefits, alternative treatment choices, and consequences of non-treatment discussed with patient/family/caregiver  [FreeTextEntry5] : psychoeducation and supportive therapy provided and discussed rationale for med change considered Start Lexapro 5 mg/day for 4 days then increase to 10 mg/day Xanax 0.25 mg once daily as needed.  Educated patient of importance of being and remaining abstinent from drugs and alcohol.  Emergency procedures were discussed: pt. educated to call 911 or go to nearest ER for worsening of symptoms/suicidal/homicidal ideation.  RTC in 3 weeks or earlier as needed.  Patient expressed understanding and agreement with above plan.   I stop checked: Reference #: [id number]  Practitioner Count: 1 Pharmacy Count: 1 Current Opioid Prescriptions: 0 Current Benzodiazepine Prescriptions: 0 Current Stimulant Prescriptions: 0   Patient Demographic Information (PDI)     PDI	First Name	Last Name	Birth Date	Gender	Street Address	[facility]	Zip Code A	John	Lombardo	1965	Male	[address]	[facility]	[address]  Prescription Information    PDI Filter:   PDI	My Rx	Current Rx	Drug Type	Rx Written	Rx Dispensed	Drug	Quantity	Days Supply	Prescriber Name	Prescriber MAVIS #	Payment Method	Dispenser A	Y	N	B	06/12/2024	06/13/2024	alprazolam 0.25 mg tablet	30	30	Kasey Gu	[id number]	Medicare	[id number] A	Y	N	B	04/23/2024	04/24/2024	alprazolam 0.25 mg tablet	30	30	Kasey Gu	[id number]	Medicare	[id number]  I spent a total of 20 minutes for today's visit in evaluating and treating the patient as per above, including: preparing for patient's appointment (review of prior documents, [facility] ), performing a medically necessary exam/evaluation, communicating/counseling/educating the patient ordering medications, documenting clinical information in the EHR

## 2024-09-02 NOTE — PLAN
[No] : No [Medication education provided] : Medication education provided. [Rationale for medication choices, possible risks/precautions, benefits, alternative treatment choices, and consequences of non-treatment discussed] : Rationale for medication choices, possible risks/precautions, benefits, alternative treatment choices, and consequences of non-treatment discussed with patient/family/caregiver  [FreeTextEntry5] : psychoeducation and supportive therapy provided and discussed rationale for med change considered Start Lexapro 5 mg/day for 4 days then increase to 10 mg/day Xanax 0.25 mg once daily as needed.  Educated patient of importance of being and remaining abstinent from drugs and alcohol.  Emergency procedures were discussed: pt. educated to call 911 or go to nearest ER for worsening of symptoms/suicidal/homicidal ideation.  RTC in 3 weeks or earlier as needed.  Patient expressed understanding and agreement with above plan.   I stop checked: Reference #: [id number]  Practitioner Count: 1 Pharmacy Count: 1 Current Opioid Prescriptions: 0 Current Benzodiazepine Prescriptions: 0 Current Stimulant Prescriptions: 0   Patient Demographic Information (PDI)     PDI	First Name	Last Name	Birth Date	Gender	Street Address	[facility]	Zip Code A	John	Lombardo	1965	Male	[address]	[facility]	[address]  Prescription Information    PDI Filter:   PDI	My Rx	Current Rx	Drug Type	Rx Written	Rx Dispensed	Drug	Quantity	Days Supply	Prescriber Name	Prescriber MAVIS #	Payment Method	Dispenser A	Y	N	B	06/12/2024	06/13/2024	alprazolam 0.25 mg tablet	30	30	Kasey Gu	[id number]	Medicare	[id number] A	Y	N	B	04/23/2024	04/24/2024	alprazolam 0.25 mg tablet	30	30	Kasey Gu	[id number]	Medicare	[id number]  I spent a total of 20 minutes for today's visit in evaluating and treating the patient as per above, including: preparing for patient's appointment (review of prior documents, [facility] ), performing a medically necessary exam/evaluation, communicating/counseling/educating the patient ordering medications, documenting clinical information in the EHR

## 2024-09-02 NOTE — HISTORY OF PRESENT ILLNESS
[FreeTextEntry1] : Med changes made on last visit: None Patient reported that he understands the reason why he needed to taper venlafaxine in a short duration of time and he has been experiencing "brain zaps, feeling vibrations" in his body and having nausea, tremors and dizziness.  Patient reported that he did not start taking Pristiq because he was told by the pharmacy that could not be filled and he is not sure of the reasons.  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  Patient states that he was prescribed a medication that needed to be compounded as it is not available in the [place].  Patient reported that he is waiting for the new compounded medication to arrive. Patient reports that he feels anxious when he has vertigo and when vertigo is worse he is unable to drive.  Patient states that he is still having vertigo/dizziness which he states is mild and less frequent.  Patient reported that he is able to sleep about 5 to 6 hours and his appetite varies and is feeling nauseous he is eating less. He denied passive/active SI He denied hearing voices or noise.  He continues to have lower and upper back pain.  Adherence to med regimen: Good  Reviewed previous med trials: Duloxetine in 2017 before switching to Prozac and higher dose of Prozac made him feel lethargic. Other meds in the past: Lexapro- stopped being effective, Zoloft, risperidone, lithium. He thinks he may have been on Effexor but does not remember the effect and or duration.

## 2024-09-02 NOTE — REASON FOR VISIT
[Patient preference] : as per patient preference [Telehealth (audio & video) - Individual/Group] : This visit was provided via telehealth using real-time 2-way audio visual technology. [Other Location: e.g. Home (Enter Location, City,State)___] : The provider was located at [unfilled]. [Home] : The patient, [unfilled], was located at home, [unfilled], at the time of the visit. [Verbal consent obtained from patient/other participant(s)] : Verbal consent for telehealth/telephonic services obtained from patient/other participant(s) [Patient] : Patient [Telephone (audio) - Individual/Group] : This telephonic visit was provided via audio only technology. [Technical or other issues] : Patient unable to effectively utilize tele-video due to technical or other issues. [Medical Office: ([facility])___] : The provider was located at the medical office in [unfilled]. [FreeTextEntry1] : anxiety

## 2024-09-02 NOTE — PHYSICAL EXAM
[None] : none [Average] : average [Cooperative] : cooperative [Clear] : clear [Linear/Goal Directed] : linear/goal directed [None Reported] : none reported [WNL] : within normal limits [FreeTextEntry8] : "okay"  [Anxious] : anxious [de-identified] : slightly constricted [FreeTextEntry7] : No SI/HI

## 2024-09-03 ENCOUNTER — EMERGENCY (EMERGENCY)
Facility: HOSPITAL | Age: 59
LOS: 1 days | Discharge: HOME | End: 2024-09-03
Attending: EMERGENCY MEDICINE | Admitting: STUDENT IN AN ORGANIZED HEALTH CARE EDUCATION/TRAINING PROGRAM
Payer: MEDICARE

## 2024-09-03 VITALS
DIASTOLIC BLOOD PRESSURE: 90 MMHG | HEIGHT: 70.5 IN | HEART RATE: 79 BPM | OXYGEN SATURATION: 96 % | WEIGHT: 235.01 LBS | TEMPERATURE: 98 F | SYSTOLIC BLOOD PRESSURE: 150 MMHG | RESPIRATION RATE: 19 BRPM

## 2024-09-03 LAB
ADD ON TEST-SPECIMEN IN LAB: SIGNIFICANT CHANGE UP
ADD ON TEST-SPECIMEN IN LAB: SIGNIFICANT CHANGE UP
ALBUMIN SERPL ELPH-MCNC: 4.8 G/DL — SIGNIFICANT CHANGE UP (ref 3.3–5)
ALP SERPL-CCNC: 81 U/L — SIGNIFICANT CHANGE UP (ref 40–120)
ALT FLD-CCNC: 59 U/L — HIGH (ref 4–41)
ANION GAP SERPL CALC-SCNC: 13 MMOL/L — SIGNIFICANT CHANGE UP (ref 7–14)
AST SERPL-CCNC: 37 U/L — SIGNIFICANT CHANGE UP (ref 4–40)
BASOPHILS # BLD AUTO: 0.05 K/UL — SIGNIFICANT CHANGE UP (ref 0–0.2)
BASOPHILS NFR BLD AUTO: 0.6 % — SIGNIFICANT CHANGE UP (ref 0–2)
BILIRUB SERPL-MCNC: 1.1 MG/DL — SIGNIFICANT CHANGE UP (ref 0.2–1.2)
BUN SERPL-MCNC: 10 MG/DL — SIGNIFICANT CHANGE UP (ref 7–23)
CA-I BLD-SCNC: 1.07 MMOL/L — LOW (ref 1.15–1.29)
CALCIUM SERPL-MCNC: 9.9 MG/DL — SIGNIFICANT CHANGE UP (ref 8.4–10.5)
CHLORIDE SERPL-SCNC: 101 MMOL/L — SIGNIFICANT CHANGE UP (ref 98–107)
CO2 SERPL-SCNC: 21 MMOL/L — LOW (ref 22–31)
CREAT SERPL-MCNC: 0.84 MG/DL — SIGNIFICANT CHANGE UP (ref 0.5–1.3)
EGFR: 101 ML/MIN/1.73M2 — SIGNIFICANT CHANGE UP
EOSINOPHIL # BLD AUTO: 0.08 K/UL — SIGNIFICANT CHANGE UP (ref 0–0.5)
EOSINOPHIL NFR BLD AUTO: 0.9 % — SIGNIFICANT CHANGE UP (ref 0–6)
GLUCOSE SERPL-MCNC: 99 MG/DL — SIGNIFICANT CHANGE UP (ref 70–99)
HCT VFR BLD CALC: 53 % — HIGH (ref 39–50)
HGB BLD-MCNC: 16.4 G/DL — SIGNIFICANT CHANGE UP (ref 13–17)
IANC: 6.92 K/UL — SIGNIFICANT CHANGE UP (ref 1.8–7.4)
IMM GRANULOCYTES NFR BLD AUTO: 0.4 % — SIGNIFICANT CHANGE UP (ref 0–0.9)
LYMPHOCYTES # BLD AUTO: 1.42 K/UL — SIGNIFICANT CHANGE UP (ref 1–3.3)
LYMPHOCYTES # BLD AUTO: 15.7 % — SIGNIFICANT CHANGE UP (ref 13–44)
MAGNESIUM SERPL-MCNC: 2 MG/DL — SIGNIFICANT CHANGE UP (ref 1.6–2.6)
MCHC RBC-ENTMCNC: 19.5 PG — LOW (ref 27–34)
MCHC RBC-ENTMCNC: 30.9 GM/DL — LOW (ref 32–36)
MCV RBC AUTO: 63.2 FL — LOW (ref 80–100)
MONOCYTES # BLD AUTO: 0.52 K/UL — SIGNIFICANT CHANGE UP (ref 0–0.9)
MONOCYTES NFR BLD AUTO: 5.8 % — SIGNIFICANT CHANGE UP (ref 2–14)
NEUTROPHILS # BLD AUTO: 6.92 K/UL — SIGNIFICANT CHANGE UP (ref 1.8–7.4)
NEUTROPHILS NFR BLD AUTO: 76.6 % — SIGNIFICANT CHANGE UP (ref 43–77)
NRBC # BLD: 0 /100 WBCS — SIGNIFICANT CHANGE UP (ref 0–0)
NRBC # FLD: 0 K/UL — SIGNIFICANT CHANGE UP (ref 0–0)
NT-PROBNP SERPL-SCNC: <36 PG/ML — SIGNIFICANT CHANGE UP
PHOSPHATE SERPL-MCNC: 2.9 MG/DL — SIGNIFICANT CHANGE UP (ref 2.5–4.5)
PLATELET # BLD AUTO: 229 K/UL — SIGNIFICANT CHANGE UP (ref 150–400)
POTASSIUM SERPL-MCNC: 4.1 MMOL/L — SIGNIFICANT CHANGE UP (ref 3.5–5.3)
POTASSIUM SERPL-SCNC: 4.1 MMOL/L — SIGNIFICANT CHANGE UP (ref 3.5–5.3)
PROT SERPL-MCNC: 8 G/DL — SIGNIFICANT CHANGE UP (ref 6–8.3)
RBC # BLD: >7 M/UL — HIGH (ref 4.2–5.8)
RBC # FLD: 20.1 % — HIGH (ref 10.3–14.5)
SODIUM SERPL-SCNC: 135 MMOL/L — SIGNIFICANT CHANGE UP (ref 135–145)
TROPONIN T, HIGH SENSITIVITY RESULT: 9 NG/L — SIGNIFICANT CHANGE UP
TROPONIN T, HIGH SENSITIVITY RESULT: 9 NG/L — SIGNIFICANT CHANGE UP
WBC # BLD: 9.03 K/UL — SIGNIFICANT CHANGE UP (ref 3.8–10.5)
WBC # FLD AUTO: 9.03 K/UL — SIGNIFICANT CHANGE UP (ref 3.8–10.5)

## 2024-09-03 PROCEDURE — 93010 ELECTROCARDIOGRAM REPORT: CPT

## 2024-09-03 PROCEDURE — 71046 X-RAY EXAM CHEST 2 VIEWS: CPT | Mod: 26

## 2024-09-03 PROCEDURE — 99223 1ST HOSP IP/OBS HIGH 75: CPT | Mod: FS

## 2024-09-03 RX ORDER — ACETAMINOPHEN 325 MG/1
650 TABLET ORAL EVERY 6 HOURS
Refills: 0 | Status: ACTIVE | OUTPATIENT
Start: 2024-09-03 | End: 2025-08-02

## 2024-09-03 RX ORDER — ALLOPURINOL 300 MG
300 TABLET ORAL DAILY
Refills: 0 | Status: ACTIVE | OUTPATIENT
Start: 2024-09-03 | End: 2025-08-02

## 2024-09-03 RX ORDER — LORAZEPAM 4 MG/ML
1 INJECTION INTRAMUSCULAR; INTRAVENOUS ONCE
Refills: 0 | Status: DISCONTINUED | OUTPATIENT
Start: 2024-09-03 | End: 2024-09-03

## 2024-09-03 RX ORDER — ESCITALOPRAM OXALATE 10 MG/1
10 TABLET ORAL DAILY
Refills: 0 | Status: ACTIVE | OUTPATIENT
Start: 2024-09-03 | End: 2025-08-02

## 2024-09-03 RX ORDER — MONTELUKAST SODIUM 5 MG/1
10 TABLET, CHEWABLE ORAL DAILY
Refills: 0 | Status: ACTIVE | OUTPATIENT
Start: 2024-09-03 | End: 2025-08-02

## 2024-09-03 RX ADMIN — LORAZEPAM 1 MILLIGRAM(S): 4 INJECTION INTRAMUSCULAR; INTRAVENOUS at 18:37

## 2024-09-03 NOTE — ED ADULT NURSE NOTE - OBJECTIVE STATEMENT
Pt states he checked his blood pressure at home and was concerned it was high. Pt denies chest pain or SOB, no palpitations.  Pt reports dizziness but states he has a hx of vertigo.  Pt lungs clear, equal and unlabored, abdomen soft, skin intact. IV placed right AC#20.  Pt awaiting xray and lab results

## 2024-09-03 NOTE — ED ADULT TRIAGE NOTE - CHIEF COMPLAINT QUOTE
oxcarbazepine and Ativan      LUIS. Patient states that his blood pressure was high this morning, has the sweats, chills, nausea and lightheaded since this morning.

## 2024-09-03 NOTE — ED PROVIDER NOTE - CLINICAL SUMMARY MEDICAL DECISION MAKING FREE TEXT BOX
58 yr male with HTN, anexity and vertigo presents due to hypertension and feeling off. Started this morning when he woke up, he jerked awake. he states he feels jittery/lightheaded and off and a weird feeling that is not consistent with his regular anxiety. Similar but not the same. Denies fever, chills, chest pain shortness of breath . He has had a few episodes of nausea and dry heaving.   Recently changed medications for anxiety-was previously on effexor and now is on lexapro.  PE: Tympanic membrane pearly gray no erythema, lungs CTA, gait normal   ddx: dehydration, electrolyte issue, anemia, ACS  PLAN: CBC, CMP CXR, EKG -> results were normal -> patient is NAD, walking well he can fu with PCP

## 2024-09-03 NOTE — ED PROVIDER NOTE - ATTENDING CONTRIBUTION TO CARE
GEN - nontoxic appearing; A+O x3   HEAD - NC/AT   EYES- PERRL, EOMI  ENT: Airway patent, mmm, Oral cavity and pharynx normal. No inflammation, swelling, exudate, or lesions.  NECK: Neck supple  PULMONARY - CTA b/l, symmetric breath sounds.   CARDIAC -s1s2, RRR, no M,G,R  ABDOMEN - +BS, ND, NT, soft, no guarding, no rebound, no masses   BACK - no CVA tenderness, Normal  spine   EXTREMITIES - FROM, symmetric pulses, capillary refill < 2 seconds, no edema   SKIN - no rash or bruising   NEUROLOGIC - ao3, cn2-12 intact, 5/5 strength in all extremities, sensation grossly intact, f-n nl, no dysdiadochokinesia, gait steady  PSYCH - appears anxious, pacing in room  a/p-58m presents to the ed with concern for feeling "off". Reports starting from when he woke this am, he felt jittery and intermittently lightheaded. Reports he recently switched from effexor to lexapro under the guidance of his doctor and thinks he did it too quickly. He does have a history of vertigo but this feels different. No vision changes, ha, cp, sob, cough, congestion, vomiting, weakness, numbness, imbalance, confusion. Patient appears anxious on exam, otherwise normal pe, nl neuro exam. Suspect possibly 2/2 medication change, will check labs/cxr/ekg to eval for medical cause for symptoms. Offered small dose po benzo for anxiety though patient would like to hold off at this time. Reports his bps are always normal and he is not on an antihypertensive. Found his bp to be high today (150/111) so came to ed. EKG reviewed with nsr, no isch change, no strain. Suspect htn is not primary in nature though rather reactive. Patient and mother at bedside understand plan and are agreeable.

## 2024-09-03 NOTE — ED ADULT NURSE NOTE - NSFALLUNIVINTERV_ED_ALL_ED
Bed/Stretcher in lowest position, wheels locked, appropriate side rails in place/Call bell, personal items and telephone in reach/Instruct patient to call for assistance before getting out of bed/chair/stretcher/Non-slip footwear applied when patient is off stretcher/[place] to call system/Physically safe environment - no spills, clutter or unnecessary equipment/Purposeful proactive rounding/Room/bathroom lighting operational, light cord in reach

## 2024-09-03 NOTE — ED ADULT NURSE NOTE - CHIEF COMPLAINT QUOTE
Patient states that his blood pressure was high this morning, has the sweats, chills, nausea and lightheaded since this morning.

## 2024-09-03 NOTE — ED PROVIDER NOTE - NSFOLLOWUPINSTRUCTIONS_ED_ALL_ED_FT
SEEK IMMEDIATE MEDICAL CARE IF YOU HAVE ANY OF THE FOLLOWING SYMPTOMS: worsening chest pain, coughing up blood, unexplained back/neck/jaw pain, severe abdominal pain, dizziness or lightheadedness, fainting, shortness of breath, sweaty or clammy skin, vomiting, or racing heart beat. These symptoms may represent a serious problem that is an emergency. Do not wait to see if the symptoms will go away. Get medical help right away. Call 911 and do not drive yourself to the hospital.   FOLLOW UP WITH YOUR PCP

## 2024-09-03 NOTE — ED PROVIDER NOTE - OBJECTIVE STATEMENT
58 yr male with HTN, anexity and vertigo presents due to hypertension and feeling off. Started this morning when he woke up. he states he feels lightheaded and off and a weird feeling that is not anxeity. Denies fever, chills, chest pain shortness of breath . He has had a few episodes of nausea and dry heaving.   Recently changed medications for vertigo  (INCOMPLETE) 58 yr male with HTN, anexity and vertigo presents due to hypertension and feeling off. Started this morning when he woke up, he jerked awake. he states he feels jittery/lightheaded and off and a weird feeling that is not consisten with his regular anxiety. Similar but not the same. Denies fever, chills, chest pain shortness of breath . He has had a few episodes of nausea and dry heaving.   Recently changed medications for anxiety-was previously on effexor and now is on lexapro.  (INCOMPLETE) 58 yr male with HTN, anexity and vertigo presents due to hypertension and feeling off. Started this morning when he woke up, he jerked awake. he states he feels jittery/lightheaded and off and a weird feeling that is not consistent with his regular anxiety. Similar but not the same. Denies fever, chills, chest pain shortness of breath . He has had a few episodes of nausea and dry heaving.   Recently changed medications for anxiety-was previously on effexor and now is on lexapro.

## 2024-09-03 NOTE — ED CDU PROVIDER INITIAL DAY NOTE - OBJECTIVE STATEMENT
58 yr male with HTN, anexity and vertigo presents due to hypertension and feeling off. Started this morning when he woke up, he jerked awake. he states he feels jittery/lightheaded and off and a weird feeling that is not consistent with his regular anxiety. Similar but not the same. Denies fever, chills, chest pain shortness of breath . He has had a few episodes of nausea and dry heaving.   Recently changed medications for anxiety-was previously on effexor and now is on lexapro.    CDU RIVERA Fernandez: ED note as above and agreed with HPI.  Patient is a 58-year-old male past medical history of hypertension, anxiety and vertigo presents to the ED complaining of high blood pressure.  Patient states morning when he woke up he felt jittery and lightheaded which is not consistent with his regular anxiety.  Patient states he recently changed medication for anxiety which she was previously on effexors and now is on lexapro.  While in the ED labs acutely unremarkable, troponin negative x 2, BNP negative.  Chest x-ray significant for bilateral perihilar streaky opacities suggestive of pulmonary edema.  Patient placed in CDU for echo and telemetry doc.

## 2024-09-03 NOTE — ED ADULT NURSE REASSESSMENT NOTE - NS ED NURSE REASSESS COMMENT FT1
Pt offers no complaints at this time. Respirations even and unlabored. No signs of distress noted. Pt admitted to CDU. Report given to STEPHANIA Guillaume. Pt stable for transport.

## 2024-09-03 NOTE — ED CDU PROVIDER INITIAL DAY NOTE - CLINICAL SUMMARY MEDICAL DECISION MAKING FREE TEXT BOX
Patient is a 58-year-old male past medical history of hypertension, anxiety and vertigo presents to the ED complaining of high blood pressure.  Patient states morning when he woke up he felt jittery and lightheaded which is not consistent with his regular anxiety.  Patient states he recently changed medication for anxiety which she was previously on effexors and now is on lexapro.  While in the ED labs acutely unremarkable, troponin negative x 2, BNP negative.  Chest x-ray significant for bilateral perihilar streaky opacities suggestive of pulmonary edema.  Patient placed in CDU for echo, cardiac monitoring and telemetry doc.

## 2024-09-03 NOTE — ED ADULT TRIAGE NOTE - AS PAIN REST
Chief Complaint   Patient presents with   • Consultation     had recent u/s and dx was fibroids      Referring MD: Dedrick Reyes    49 year old .    HPI: here for f/u fibroid. Denies heavy menses. Reports recurrent UTI treated by PMD, neg kidney US per pt.     OB History    Para Term  AB Living   2 2 2 0 0 2   SAB TAB Ectopic Molar Multiple Live Births   0 0 0 0 0 2       Gyne hx: Patient's last menstrual period was 2020 (approximate).   Reg/3d/3-4PPD/nl pap, no std, fibroid    Past Medical History:   Diagnosis Date   • Hypercholesteremia        History reviewed. No pertinent surgical history.    Family History   Problem Relation Age of Onset   • Cancer Neg Hx    • Clotting Disorder Neg Hx        Social History     Tobacco Use   • Smoking status: Never Smoker   • Smokeless tobacco: Never Used   Substance Use Topics   • Alcohol use: Not Currently   • Drug use: Never       Current Outpatient Medications   Medication Sig Dispense Refill   • atorvastatin (LIPITOR) 20 MG tablet Take 20 mg by mouth daily.     • naproxen (NAPROSYN) 500 MG tablet Take 1 tablet by mouth 2 times daily (with meals). 30 tablet 3   • levoFLOXacin (LEVAQUIN) 500 MG tablet Take 500 mg by mouth daily.       No current facility-administered medications for this visit.        ALLERGIES: no known allergies.    All systems reviewed and negative except for those mentioned in the history of present illness    P/E:    Visit Vitals  /68   Pulse 70   Temp 98.6 °F (37 °C)   Wt 65.3 kg (144 lb)   LMP 2020 (Approximate)   SpO2 98%       Constitutional Exam: well-groomed, pleasant, in no acute distress    Abdomen: soft, non tender, No masses    PELVIC EXAM:  Vulva: grossly unremarkable,no lesions or masses noted  Vagina: normal size & caliber, no abnormal discharge  Cervix: Normal in appearance,no lesions or masses,No cervical motion tenderness  Uterus: 12-14 weeks size, NT, NR, NG  Adnexa: unremarkable,non-tender,no  fullness noted,no masses noted    Extremities: normal, No edema/erythema    ASSESSMENT/PLAN:     1. Perimenopausal Fibroid uterus. Pathophysiology d/w pt. Aware that Fibroids are benign and will decrease in size after menopause (average age 51yo).   -given normal flow menses, no need to treat. Will obtain CBC from PMD (KHANG signed)   -will recheck US in 5/20 to confirm stability  -continue naproxen with food for premenstrual cramps  2. Recurrent UTI. Asx at today's visit  - Pt aware fibroids may cause sx of urgency and frequency but don't cause UTI.   -pathophysiology of UTI d/w pt  -UTI prevention precautions d/w pt  -Recommend f/u with urology if pt has repeatative culture documented UTI  3. KHANG signed to obtain nl pap smear done in 10/19 per pt  4. Nl mammo and breast US in 5/19     A total of 30 minutes was spent performing the gynecologic ultrasound, reviewing record, consultation, and coordination of care. More than 50% of this time was spent with patient providing face to face counseling.     -All questions answered       Return for care as scheduled.    Shaista Jasso MD   0 (no pain/absence of nonverbal indicators of pain)

## 2024-09-03 NOTE — ED PROVIDER NOTE - PROGRESS NOTE DETAILS
Patient was feeling anxious and received Ativan which provided good relief and is now feeling better.  Results were discussed with patient in detail, additional labs were added and patient was notified of updates.  Discussed CDU for further evaluation of chest x-ray findings.  Patient agreeable to plan, evaluated by CDU PA who accepts to the unit.

## 2024-09-04 ENCOUNTER — RESULT REVIEW (OUTPATIENT)
Age: 59
End: 2024-09-04

## 2024-09-04 LAB
D DIMER BLD IA.RAPID-MCNC: <150 NG/ML DDU — SIGNIFICANT CHANGE UP
TROPONIN T, HIGH SENSITIVITY RESULT: 9 NG/L — SIGNIFICANT CHANGE UP

## 2024-09-04 PROCEDURE — 71275 CT ANGIOGRAPHY CHEST: CPT | Mod: 26,MC

## 2024-09-04 PROCEDURE — 93306 TTE W/DOPPLER COMPLETE: CPT | Mod: 26

## 2024-09-04 PROCEDURE — 93010 ELECTROCARDIOGRAM REPORT: CPT

## 2024-09-04 PROCEDURE — 99233 SBSQ HOSP IP/OBS HIGH 50: CPT | Mod: FS

## 2024-09-04 RX ADMIN — ESCITALOPRAM OXALATE 10 MILLIGRAM(S): 10 TABLET ORAL at 05:48

## 2024-09-04 RX ADMIN — Medication 300 MILLIGRAM(S): at 05:48

## 2024-09-04 RX ADMIN — MONTELUKAST SODIUM 10 MILLIGRAM(S): 5 TABLET, CHEWABLE ORAL at 05:48

## 2024-09-04 RX ADMIN — Medication 1 PUFF(S): at 09:19

## 2024-09-04 NOTE — CONSULT NOTE ADULT - SUBJECTIVE AND OBJECTIVE BOX
CHIEF COMPLAINT:Patient is a 58y old  Male who presents with a chief complaint of     HISTORY OF PRESENT ILLNESS:    58 male with anxiety presents with HTN   denies any chest pain, sob, palpitation, dizziness or syncope.     PAST MEDICAL & SURGICAL HISTORY:  Anxiety      Asthma, mild              MEDICATIONS:      albuterol    90 MICROgram(s) HFA Inhaler 1 Puff(s) Inhalation daily  montelukast 10 milliGRAM(s) Oral daily    acetaminophen     Tablet .. 650 milliGRAM(s) Oral every 6 hours PRN  escitalopram 10 milliGRAM(s) Oral daily      allopurinol 300 milliGRAM(s) Oral daily        FAMILY HISTORY:      Non-contributory    SOCIAL HISTORY:    No tobacco, drugs or etoh    Allergies    penicillin (Hives; Rash)  Advil (Rash)  Zithromax (Hives; Rash)    Intolerances    	    REVIEW OF SYSTEMS:  as above  The rest of the 14 points ROS reviewed and except above they are unremarkable.        PHYSICAL EXAM:  T(C): 36.6 (09-04-24 @ 05:49), Max: 36.8 (09-03-24 @ 20:26)  HR: 64 (09-04-24 @ 05:49) (64 - 86)  BP: 123/74 (09-04-24 @ 05:49) (112/76 - 156/88)  RR: 16 (09-04-24 @ 05:49) (16 - 19)  SpO2: 94% (09-04-24 @ 05:49) (94% - 96%)  Wt(kg): --  I&O's Summary      Appearance: Normal	  HEENT:   no gross abnormality   Cardiovascular: Normal S1 S2,    Murmur:   Neck: JVP normal  Respiratory: Lungs clear   Gastrointestinal:  Soft, Non-tender  Skin: normal   Neuro: No gross deficits.   Psychiatry:  Mood & affect flat  Ext: No edema    LABS/DATA:    TELEMETRY: 	    ECG:  	   	  CARDIAC MARKERS:                        9 <<== 09-03-24 @ 20:38                9 <<== 09-03-24 @ 16:20                              16.4   9.03  )-----------( 229      ( 03 Sep 2024 16:20 )             53.0     09-03    135  |  101  |  10  ----------------------------<  99  4.1   |  21<L>  |  0.84    Ca    9.9      03 Sep 2024 16:20  Phos  2.9     09-03  Mg     2.00     09-03    TPro  8.0  /  Alb  4.8  /  TBili  1.1  /  DBili  x   /  AST  37  /  ALT  59<H>  /  AlkPhos  81  09-03    proBNP:   Lipid Profile:   HgA1c:   TSH: Thyroid Stimulating Hormone, Serum: 0.54 uIU/mL (09-03 @ 16:20)

## 2024-09-05 ENCOUNTER — RESULT REVIEW (OUTPATIENT)
Age: 59
End: 2024-09-05

## 2024-09-05 VITALS
HEART RATE: 110 BPM | TEMPERATURE: 98 F | SYSTOLIC BLOOD PRESSURE: 135 MMHG | DIASTOLIC BLOOD PRESSURE: 90 MMHG | RESPIRATION RATE: 16 BRPM | OXYGEN SATURATION: 95 %

## 2024-09-05 PROCEDURE — 78451 HT MUSCLE IMAGE SPECT SING: CPT | Mod: 26,MC

## 2024-09-05 PROCEDURE — 93010 ELECTROCARDIOGRAM REPORT: CPT

## 2024-09-05 PROCEDURE — 93016 CV STRESS TEST SUPVJ ONLY: CPT | Mod: GC,MC

## 2024-09-05 PROCEDURE — 93018 CV STRESS TEST I&R ONLY: CPT | Mod: GC,MC

## 2024-09-05 PROCEDURE — 99239 HOSP IP/OBS DSCHRG MGMT >30: CPT

## 2024-09-05 RX ORDER — PANTOPRAZOLE SODIUM 40 MG
40 TABLET, DELAYED RELEASE (ENTERIC COATED) ORAL
Refills: 0 | Status: ACTIVE | OUTPATIENT
Start: 2024-09-05 | End: 2025-08-04

## 2024-09-05 RX ORDER — ONDANSETRON 2 MG/ML
4 INJECTION, SOLUTION INTRAMUSCULAR; INTRAVENOUS EVERY 8 HOURS
Refills: 0 | Status: ACTIVE | OUTPATIENT
Start: 2024-09-05 | End: 2025-08-04

## 2024-09-05 RX ADMIN — MONTELUKAST SODIUM 10 MILLIGRAM(S): 5 TABLET, CHEWABLE ORAL at 06:18

## 2024-09-05 RX ADMIN — Medication 40 MILLIGRAM(S): at 06:18

## 2024-09-05 RX ADMIN — ONDANSETRON 4 MILLIGRAM(S): 2 INJECTION, SOLUTION INTRAMUSCULAR; INTRAVENOUS at 04:29

## 2024-09-05 RX ADMIN — Medication 1 PUFF(S): at 09:08

## 2024-09-05 RX ADMIN — Medication 300 MILLIGRAM(S): at 06:18

## 2024-09-05 RX ADMIN — ESCITALOPRAM OXALATE 10 MILLIGRAM(S): 10 TABLET ORAL at 06:18

## 2024-09-05 NOTE — ED CDU PROVIDER SUBSEQUENT DAY NOTE - CLINICAL SUMMARY MEDICAL DECISION MAKING FREE TEXT BOX
Patient is a 58-year-old male past medical history of hypertension, anxiety and vertigo presents to the ED complaining of high blood pressure.  Patient states morning when he woke up he felt jittery and lightheaded which is not consistent with his regular anxiety.  Patient states he recently changed medication for anxiety which she was previously on effexors and now is on lexapro.  While in the ED labs acutely unremarkable, troponin negative x 2, BNP negative.  Chest x-ray significant for bilateral perihilar streaky opacities suggestive of pulmonary edema.  Patient placed in CDU for echo, cardiac monitoring and telemetry doc
Patient is a 58-year-old male past medical history of hypertension, anxiety and vertigo presents to the ED complaining of high blood pressure.  Patient states morning when he woke up he felt jittery and lightheaded which is not consistent with his regular anxiety.  Patient states he recently changed medication for anxiety which she was previously on effexors and now is on lexapro.  While in the ED labs acutely unremarkable, troponin negative x 2, BNP negative.  Chest x-ray significant for bilateral perihilar streaky opacities suggestive of pulmonary edema.  Patient placed in CDU for echo, cardiac monitoring and telemetry doc. Echo WNL. Pt pending nuclear stress.

## 2024-09-05 NOTE — ED CDU PROVIDER DISPOSITION NOTE - PATIENT PORTAL LINK FT
You can access the FollowMyHealth Patient Portal offered by [facility] by registering at the following website: [email or web address]. By joining [employer]’s FollowMyHealth portal, you will also be able to view your health information using other applications (apps) compatible with our system.

## 2024-09-05 NOTE — ED CDU PROVIDER SUBSEQUENT DAY NOTE - NSICDXPASTMEDICALHX_GEN_ALL_CORE_FT
PAST MEDICAL HISTORY:  Anxiety     Asthma, mild     
PAST MEDICAL HISTORY:  Anxiety     Asthma, mild

## 2024-09-05 NOTE — ED CDU PROVIDER DISPOSITION NOTE - CARE PROVIDER_API CALL
Tevin Albert  Cardiovascular Disease  [address] [address]  Phone: [phone]  Fax: [phone]  Follow Up Time:

## 2024-09-05 NOTE — ED CDU PROVIDER DISPOSITION NOTE - NSFOLLOWUPINSTRUCTIONS_ED_ALL_ED_FT
Follow up with your Doctor in 1-2 days.    Follow up with your Pulmonologist in 1-2 days.  Follow up with cardiology in 1-2 days.    Return to the ER for any persistent/worsening or new symptoms, chest pain, shortness of breath, palpitations, dizziness or any concerning symptoms.

## 2024-09-05 NOTE — ED CDU PROVIDER SUBSEQUENT DAY NOTE - NS ED ATTENDING STATEMENT MOD
This was a shared visit with the ALEKSANDAR. I reviewed and verified the documentation.
This was a shared visit with the ALEKSANDAR. I reviewed and verified the documentation.

## 2024-09-05 NOTE — ED CDU PROVIDER DISPOSITION NOTE - ATTENDING CONTRIBUTION TO CARE
58M h/o HTN, anxiety, vertigo presented with elevated blood pressure a/w lightheadedness which is not consistent with his regular anxiety.  Patient states he recently changed medication for anxiety which she was previously on effexors and now is on lexapro.  While in the ED labs acutely unremarkable, troponin negative x 2, BNP negative. Patient placed in CDU for echo, cardiac monitoring and telemetry doc. Overnight, pt has several episodes of hypoxia with diaphoresis lasting a few minutes and self-resolved. States it is different from baseline anxiety. Currently sat 97-98% on room air. Ambulatory sat stable 94-95% in room air. TTE with normal LV/RV size and function. EF 58% Stress with small-sized, mild defect(s) in the apical wall suggestive of prominent apical thinning and post-stress left ventricular EF of 59%. Results d/w Dr. Albert and amenable for discharge with outpatient follow-up.

## 2024-09-05 NOTE — ED CDU PROVIDER DISPOSITION NOTE - CLINICAL COURSE
RIVERA Argueta: 58-year-old male with a history of hypertension, anxiety, vertigo presented to the emergency department complaining of lightheadedness.  Patient placed in CDU for cardiac monitoring, echo and TeleDoc.  On the overnight patient developed an episode of decreased O2 which resolved, patient had CTA negative for PE.  Patient ordered for stress test reviewed results with Dr. Albert states patient okay for discharge home follow-up outpatient with his cardiologist.  Patient feels improved, ambulatory without difficulty, tolerating p.o..  Ambulatory sat maintained at 95%.  Discharge and results reviewed with patient.

## 2024-09-05 NOTE — ED CDU PROVIDER SUBSEQUENT DAY NOTE - HISTORY
Patient is a 58-year-old male past medical history of hypertension, anxiety and vertigo presents to the ED complaining of high blood pressure.  Patient states morning when he woke up he felt jittery and lightheaded which is not consistent with his regular anxiety.  Patient states he recently changed medication for anxiety which she was previously on effexors and now is on lexapro.  While in the ED labs acutely unremarkable, troponin negative x 2, BNP negative.  Chest x-ray significant for bilateral perihilar streaky opacities suggestive of pulmonary edema.  Patient placed in CDU for echo, cardiac monitoring and telemetry doc    Interval hx: Patient seen resting comfortably and in NAD. No acute activities on telemonitor. VSS.
Patient is a 58-year-old male past medical history of hypertension, anxiety and vertigo presents to the ED complaining of high blood pressure.  Patient states morning when he woke up he felt jittery and lightheaded which is not consistent with his regular anxiety.  Patient states he recently changed medication for anxiety which she was previously on effexors and now is on lexapro.  While in the ED labs acutely unremarkable, troponin negative x 2, BNP negative.  Chest x-ray significant for bilateral perihilar streaky opacities suggestive of pulmonary edema.  Patient placed in CDU for echo, cardiac monitoring and telemetry doc    Interval hx- VSS. Pt with episodes of diaphoresis and hypoxia, no CP, hypo/hypertension. Pt has hx of copd, denies hx of sleep apnea. Pt pending stress given these episodes

## 2024-09-05 NOTE — ED CDU PROVIDER SUBSEQUENT DAY NOTE - ATTENDING APP SHARED VISIT CONTRIBUTION OF CARE
58M h/o HTN, anxiety, vertigo presented with elevated blood pressure a/w lightheadedness which is not consistent with his regular anxiety.  Patient states he recently changed medication for anxiety which she was previously on effexors and now is on lexapro.  While in the ED labs acutely unremarkable, troponin negative x 2, BNP negative. Patient placed in CDU for echo, cardiac monitoring and telemetry doc. Overnight, pt has several episodes of hypoxia with diaphoresis lasting a few minutes and self-resolved. States it is different from baseline anxiety. Currently sat 97-98% on room air. Ambulatory sat stable 94-95% in room air. TTE with normal LV/RV size and function. EF 58%. Awaiting nuclear stress. Tele doc following.
Sanjiv Fernandes DO:  patient seen and evaluated with the PA.  I was present for key portions of the History & Physical, and I agree with the Impression & Plan. 57 yo vertigo, anxiety, htn, pw lightheadedness and sob. Patient reports due to symptoms, recently changed his medication.  Patient reporting episodes of lightheadedness, palpitations, shortness of breath, diaphoresis.  Despite no acute findings on TTE today.  Patient had episode again in CDU. Will obtain CTAGiven mild hypoxia noted intermittently.  This was while patient was awake.  Patient also notes that he recently had COVID, this could be related to complications from COVID.

## 2024-09-06 ENCOUNTER — EMERGENCY (EMERGENCY)
Facility: HOSPITAL | Age: 59
LOS: 1 days | Discharge: HOME | End: 2024-09-06
Attending: EMERGENCY MEDICINE | Admitting: EMERGENCY MEDICINE
Payer: MEDICARE

## 2024-09-06 ENCOUNTER — APPOINTMENT (OUTPATIENT)
Dept: PSYCHIATRY | Facility: CLINIC | Age: 59
End: 2024-09-06
Payer: MEDICARE

## 2024-09-06 VITALS
HEART RATE: 88 BPM | DIASTOLIC BLOOD PRESSURE: 88 MMHG | OXYGEN SATURATION: 100 % | TEMPERATURE: 98 F | RESPIRATION RATE: 16 BRPM | SYSTOLIC BLOOD PRESSURE: 130 MMHG

## 2024-09-06 VITALS
WEIGHT: 235.01 LBS | HEART RATE: 104 BPM | TEMPERATURE: 98 F | SYSTOLIC BLOOD PRESSURE: 154 MMHG | HEIGHT: 70.5 IN | OXYGEN SATURATION: 99 % | DIASTOLIC BLOOD PRESSURE: 94 MMHG | RESPIRATION RATE: 20 BRPM

## 2024-09-06 PROBLEM — F41.9 ANXIETY DISORDER, UNSPECIFIED: Chronic | Status: ACTIVE | Noted: 2024-09-03

## 2024-09-06 PROBLEM — J45.909 UNSPECIFIED ASTHMA, UNCOMPLICATED: Chronic | Status: ACTIVE | Noted: 2024-09-03

## 2024-09-06 PROCEDURE — G2211 COMPLEX E/M VISIT ADD ON: CPT

## 2024-09-06 PROCEDURE — 99284 EMERGENCY DEPT VISIT MOD MDM: CPT

## 2024-09-06 PROCEDURE — 99213 OFFICE O/P EST LOW 20 MIN: CPT

## 2024-09-06 PROCEDURE — 93010 ELECTROCARDIOGRAM REPORT: CPT

## 2024-09-06 RX ORDER — LORAZEPAM 4 MG/ML
1 INJECTION INTRAMUSCULAR; INTRAVENOUS ONCE
Refills: 0 | Status: DISCONTINUED | OUTPATIENT
Start: 2024-09-06 | End: 2024-09-06

## 2024-09-06 RX ADMIN — LORAZEPAM 1 MILLIGRAM(S): 4 INJECTION INTRAMUSCULAR; INTRAVENOUS at 10:42

## 2024-09-06 NOTE — ED ADULT NURSE NOTE - NSFALLUNIVINTERV_ED_ALL_ED
Bed/Stretcher in lowest position, wheels locked, appropriate side rails in place/Call bell, personal items and telephone in reach/Instruct patient to call for assistance before getting out of bed/chair/stretcher/Non-slip footwear applied when patient is off stretcher/[place] to call system/Physically safe environment - no spills, clutter or unnecessary equipment/Purposeful proactive rounding/Room/bathroom lighting operational, light cord in reach

## 2024-09-06 NOTE — ED PROVIDER NOTE - NSFOLLOWUPINSTRUCTIONS_ED_ALL_ED_FT
YOU WERE SEEN FOR ANXIETY    YOU WERE GIVEN ATIVAN    YOU HAVE BEEN DIAGNOSED WITH ANXIETY    REST AND PLENTY OF FLUIDS    CONTINUE YOUR LEXAPRO    MAY TAKE XANAX AS PRESCRIBED    FOLLOW UP WITH YOUR PSYCHIATRIST AS SCHEDULED    FOLLOW UP WITH YOUR PRIMARY CARE PROVIDER WITHIN 1 WEEK.    RETURN TO THE EMERGENCY DEPARTMENT FOR ANY WORSENING SYMPTOMS.

## 2024-09-06 NOTE — ED PROVIDER NOTE - PROGRESS NOTE DETAILS
Dr. Mark: Discussed with SW who noted [facility] cannot prescribe opioids for management. Discussed with pt who has appt next week with their psychiatrist. Pt also has prescription of Xanax at home which he was advised to take if needed. Pt has no SI, HI, or hallucinations. Advised should symptoms worsen prior to appt to return to ED.

## 2024-09-06 NOTE — ED PROVIDER NOTE - OBJECTIVE STATEMENT
57 y/o male with PMHx of Anxiety and HTN who presented ot the ED for anxiety over the past day. Pt states he was recently seen here for HTN and was given ativan while here that helped. Pt notes he has started back on his Lexapro but feels it has not taken effect yet. Pt called his psychiatrist who was unable to see him to next week. Pt presented to the Crisis Center but was told to come to the ED. Pt notes having some anxious feelings. Pt denies any SI, HI, or hallucinations.

## 2024-09-06 NOTE — DISCUSSION/SUMMARY
[FreeTextEntry1] : Reference #: [id number]  Practitioner Count: 1 Pharmacy Count: 1 Current Opioid Prescriptions: 0 Current Benzodiazepine Prescriptions: 0 Current Stimulant Prescriptions: 0   Patient Demographic Information (PDI)     PDI	First Name	Last Name	Birth Date	Gender	Street Address	[facility] Code A	John	Lombardo	1965	Male	[address]	[facility]	[id number]  Prescription Information    PDI Filter:   PDI	My Rx	Current Rx	Drug Type	Rx Written	Rx Dispensed	Drug	Quantity	Days Supply	Prescriber Name	Prescriber MAVIS #	Payment Method	Dispenser A	Y	N	B	06/12/2024	06/13/2024	alprazolam 0.25 mg tablet	30	30	Kasey Gu	[id number]	Medicare	[id number] A	Y	N	B	04/23/2024	04/24/2024	alprazolam 0.25 mg tablet	30	30	Kasey Gu	[id number]	Medicare	[id number]

## 2024-09-06 NOTE — ED ADULT NURSE NOTE - OBJECTIVE STATEMENT
Received pt in  pt c/o anxiety denies si/hi/avh  presently, emotional support provided safety & comfort measures maintained eval on going.

## 2024-09-06 NOTE — ED PROVIDER NOTE - CLINICAL SUMMARY MEDICAL DECISION MAKING FREE TEXT BOX
59 y/o male with PMHx of Anxiety and HTN who presented ot the ED for anxiety over the past day.   Concern for anxiety  Will give Ativan and advise f/u

## 2024-09-06 NOTE — ED PROVIDER NOTE - PATIENT PORTAL LINK FT
You can access the FollowMyHealth Patient Portal offered by [facility] by registering at the following website: [email or web address]. By joining [employer]’s FollowMyHealth portal, you will also be able to view your health information using other applications (apps) compatible with our system.

## 2024-09-09 NOTE — REASON FOR VISIT
[Patient preference] : as per patient preference [Telehealth (audio & video) - Individual/Group] : This visit was provided via telehealth using real-time 2-way audio visual technology. [Medical Office: ([facility])___] : The provider was located at the medical office in [unfilled]. [Home] : The patient, [unfilled], was located at home, [unfilled], at the time of the visit. [Participant(s) identity verified] : Participant(s) identity verified. [Verbal consent obtained from patient/other participant(s)] : Verbal consent for telehealth/telephonic services obtained from patient/other participant(s) [Patient] : Patient [FreeTextEntry1] : anxiety

## 2024-09-09 NOTE — PHYSICAL EXAM
[None] : none [Cooperative] : cooperative [Anxious] : anxious [Clear] : clear [Linear/Goal Directed] : linear/goal directed [None Reported] : none reported [WNL] : within normal limits [de-identified] : slightly constricted [FreeTextEntry7] : No SI/HI

## 2024-09-09 NOTE — PLAN
[No] : No [Medication education provided] : Medication education provided. [Rationale for medication choices, possible risks/precautions, benefits, alternative treatment choices, and consequences of non-treatment discussed] : Rationale for medication choices, possible risks/precautions, benefits, alternative treatment choices, and consequences of non-treatment discussed with patient/family/caregiver  [FreeTextEntry5] : psychoeducation and supportive therapy provided and discussed rationale for med changes Continue Lexapro 10 mg/day Xanax 0.25 mg AM daily and take 1 tab as needed; may consider switching to longer active BZD if needed, though states he was on Klonopin, does not remember why it was stopped. Educated patient of importance of being and remaining abstinent from drugs and alcohol.  Emergency procedures were discussed: pt. educated to call 911 or go to nearest ER for worsening of symptoms/suicidal/homicidal ideation.  RTC in 1 weeks or earlier as needed.  Patient expressed understanding and agreement with above plan.   I stop checked: Reference #: [id number]  Practitioner Count: 1 Pharmacy Count: 1 Current Opioid Prescriptions: 0 Current Benzodiazepine Prescriptions: 0 Current Stimulant Prescriptions: 0   Patient Demographic Information (PDI)  PDI First Name Last Name Birth Date Gender Street Address City State Zip Code A John Lombardo 1965 Male [address] [facility] [address]  Prescription Information  PDI Filter: PDI My Rx Current Rx Drug Type Rx Written Rx Dispensed Drug Quantity Days Supply Prescriber Name Prescriber MAVIS # Payment Method Dispenser A Y N B 06/12/2024 06/13/2024 alprazolam 0.25 mg tablet 30 30 Kasey Gu [id number] Medicare [id number] A Y N B 04/23/2024 04/24/2024 alprazolam 0.25 mg tablet 30 30 Kasey Gu [id number] Medicare [id number].

## 2024-09-09 NOTE — DISCUSSION/SUMMARY
[FreeTextEntry1] : The patient is a 53-year-old male with anxiety disorder, likely generalized anxiety disorder and panic attacks.   10/16/2020: Patient's father  2 weeks ago, he is sad but states he is coping. he states he did not switch to Prozac as he was busy taking care of his father, and there is not change with increased sweating and poor heat tolerance and this is limiting his ability to exercise and not able to lose weight and wants to start now.   2020: Patient reports mild episodes of anxiety, overall stable, less tired with Prozac, will continue same does   12/15/2020: Patient reports mild, but persistent and frequent episodes of anxiety, no panic attacks, tolerating Prozac, will increase dose  2021: Patient reports about 10 days after increasing Prozac dose he is excessively tired in the day and has trouble waking up and is not productive, though states he is not depressed   3/10/2021: Patient reports since December he has been excessively tired in the day, no change with reducing Prozac dose, denies feeling depressed or anxious   2021: Patient reports excessive daytime fatigue significantly improved by changing the timing of Prozac. He denies feeling depressed or anxious.   2021: Patient reports no sx of anxiety or depression since last visit.   10/11/2021: Patient reports slight increase in anxiety recently because of the pain and also feeling that he is getting overwhelmed and feeling stuck in his life. Discussed possibility of increasing Prozac which we had done last year but he felt extremely fatigued at that time. Patient reports he does not want to make any changes at this time and would like to continue with the same dose of medications.  2021: Patient reports he continues to have random, brief moments of anxiety, but overall anxiety sx in good control, not interfering with his daily functioning.   3/7/2022: Patient states anxiety is in good control, not depressed. He states he needs to get an MRI for his ankle, and coordinating to get clearances for doing ti under anesthesia has been challenging and asks if he could take his Xanax or another med. States he took Xanax 2 tabs, last time, had minimal benefit, and could take up to 1 mg, half mg 1 hr before and 0.5 mg at the time of the procedure, he plans to have a friend drive him to the appointment.   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   10/3/2022: Patient reported no change in anxiety since last visit.   2023: Patient reported that anxiety symptoms remain in good control and he had a concussion a few weeks ago and brain imaging was negative but had post concussion symptoms which he reports are improving in the past couple of weeks.  2023:  Patient continues to remain stable and anxiety symptoms remain in good control  10/23/2023: Patient reports mild anxiety since last visit, not on gabapentin for past 6 weeks otherwise in general stable.  2024: The patient reported a slight increase in caregiver stress, and he is coping well and stable   2024: Patient is reporting increased anxiety related to recent experience with vertigo and feeling like his whole body is vibrating and so far evaluation by his PCP and ENT did not find any etiology to explain these physical symptoms, he had a neurological workup couple years ago when the MRI is pending.  Given that the patient is experiencing increased anxiety and trouble sleeping we will recommend increasing gabapentin dose for increased therapeutic benefit. Previously when fluoxetine dose was higher than 30 mg/day he felt more tired and preferred to lower the dose. Patient advised to call office for Xanax Rx.  2024: The patient reported still feeling anxious and frustrated and increased dose of gabapentin is not effective.  Past med trials reviewed: Duloxetine in 2017 before switching to Prozac and higher dose of Prozac made him feel lethargic. Other meds in the past: Lexapro- stopped being effective, Zoloft, risperidone, lithium. He thinks he may have been on Effexor but does not remember the effect and or duration and agrees to a retrial.   2024: The patient states anxiety symptoms are about 90% gone with switching to venlafaxine, however he started to experience blurred vision and that is not improving. He states he would like to switch to another med but would like to start tapering to be off meds before a test ENT specialist is recommending, and for which he states he needs to off meds, and instead of resuming venlafaxine then we will plan to start desvenlafaxine, for a better side effect profile and similar mechanism of action.   2024: Patient reports that he is experiencing withdrawal symptoms from venlafaxine but did not yet start Pristiq because it was an issue with the prescription and he feels that the anxiety is related to having vertigo or other physical symptoms and at this point he feels that the withdrawal from venlafaxine is mild and he can tolerate as there has been improvement day by day and he does not want to start Pristiq because he wants to try the new compound medication for vertigo and he feels that if the vertigo is better his mood and anxiety symptoms will be much better controlled as well.  2024: Patient states he is continuing to experience discontinuation withdrawal symptoms from venlafaxine and the new compound drug he was rxde is helping with vertigo and his vision is better and would like to go back on Lexapro which he took previously, had no side effects as he is concerned about increased adverse effects with trying a new medication.   2024: The patient states within 2 days of starting Lexapro withdrawal symptoms from venlafaxine have stopped, however he is still feeling anxious, and though he was not supposed to take Xanax, went to ER 2x, as he thought he was having a heart attack and his BP was elevated, cardiac work up was negative, and he was given Ativan with some benefit.

## 2024-09-09 NOTE — HISTORY OF PRESENT ILLNESS
[FreeTextEntry1] : Med changes made on last visit: Start Lexapro 10 mg/day  Patient reported within 2 days after he started taking Lexapro withdrawal symptoms, he had form stopping venlafaxine have resolved, but in the pat 5 days he was in the ER 2x. ON 09/3 he went with high BP and was admitted for observation  because they first found a spot on his lung and his O2 sat was dropping, and he was given Ativan, his BP normalized and his cardiac work up. He states he was again having chest tightness today morning and he went to the ER because he was more anxious and was not sure if he would be able to get an appointment. He states they gave him Ativan again. He states he thought that he should not take Xanax with Lexapro, so he did not use it when he  has panic attacks.  Patient states that vertigo/dizziness have reduced, and they are mild and less frequent.  Patient reported that he can sleep about 5 to 6 hours and his appetite varies and is feeling nauseous he is eating less. He denied passive/active SI He denied hearing voices or noise.  He continues to have lower and upper back pain.  Adherence to med regimen: Good  [FreeTextEntry2] : Duloxetine in 2017 before switching to Prozac and higher dose of Prozac made him feel lethargic. Other meds in the past: Lexapro- stopped being effective, Zoloft, risperidone, lithium. He thinks he may have been on Effexor but does not remember the effect and or duration.

## 2024-09-11 ENCOUNTER — APPOINTMENT (OUTPATIENT)
Dept: PSYCHIATRY | Facility: CLINIC | Age: 59
End: 2024-09-11

## 2024-09-11 DIAGNOSIS — F41.9 ANXIETY DISORDER, UNSPECIFIED: ICD-10-CM

## 2024-09-11 PROCEDURE — 99213 OFFICE O/P EST LOW 20 MIN: CPT

## 2024-09-12 ENCOUNTER — APPOINTMENT (OUTPATIENT)
Dept: PULMONOLOGY | Facility: CLINIC | Age: 59
End: 2024-09-12
Payer: MEDICARE

## 2024-09-12 VITALS — BODY MASS INDEX: 36.34 KG/M2 | WEIGHT: 239 LBS

## 2024-09-12 DIAGNOSIS — K21.9 GASTRO-ESOPHAGEAL REFLUX DISEASE W/OUT ESOPHAGITIS: ICD-10-CM

## 2024-09-12 DIAGNOSIS — R06.83 SNORING: ICD-10-CM

## 2024-09-12 DIAGNOSIS — J45.909 UNSPECIFIED ASTHMA, UNCOMPLICATED: ICD-10-CM

## 2024-09-12 DIAGNOSIS — E66.9 OBESITY, UNSPECIFIED: ICD-10-CM

## 2024-09-12 DIAGNOSIS — R06.02 SHORTNESS OF BREATH: ICD-10-CM

## 2024-09-12 DIAGNOSIS — R94.2 ABNORMAL RESULTS OF PULMONARY FUNCTION STUDIES: ICD-10-CM

## 2024-09-12 DIAGNOSIS — Z72.820 SLEEP DEPRIVATION: ICD-10-CM

## 2024-09-12 DIAGNOSIS — J30.9 ALLERGIC RHINITIS, UNSPECIFIED: ICD-10-CM

## 2024-09-12 DIAGNOSIS — R93.89 ABNORMAL FINDINGS ON DIAGNOSTIC IMAGING OF OTHER SPECIFIED BODY STRUCTURES: ICD-10-CM

## 2024-09-12 PROCEDURE — 99214 OFFICE O/P EST MOD 30 MIN: CPT

## 2024-09-12 NOTE — PHYSICAL EXAM
[No Acute Distress] : no acute distress [Normal Oropharynx] : normal oropharynx [III] : Mallampati Class: III [Normal Appearance] : normal appearance [No Neck Mass] : no neck mass [Normal Rate/Rhythm] : normal rate/rhythm [Normal S1, S2] : normal s1, s2 [No Murmurs] : no murmurs [No Resp Distress] : no resp distress [Clear to Auscultation Bilaterally] : clear to auscultation bilaterally [No Abnormalities] : no abnormalities [Benign] : benign [Normal Gait] : normal gait [No Clubbing] : no clubbing [No Cyanosis] : no cyanosis [No Edema] : no edema [FROM] : FROM [Normal Color/ Pigmentation] : normal color/ pigmentation [No Focal Deficits] : no focal deficits [Oriented x3] : oriented x3 [Normal Affect] : normal affect [TextBox_2] : OW [TextBox_68] : I:E 1:3, clear

## 2024-09-12 NOTE — ASSESSMENT
[FreeTextEntry1] : Mr. Lombardo is a 58 year old male who has a history of asthma, allergies, GERD, primary snoring and shortness of breath. He is stable from a pulmonary perspective, aside from some shortness of breath at rest (?posture/ stress/ likely mechanical). (still) -Stress induced SOB (life better); s/p COVID-19 7/2022- resolved- stable from a pulmonary perspective- s/p EgD/colonoscopy for reflux- s/p hospitalization for anxiety - ?no nocturnal hypoxemia ?LANETTE  His shortness of breath is multifactorial due to: -poor breathing mechanics -asthma -overweight / out of shape -anxiety -?CAD  problem 1: asthma -(controlled) -continue Anoro at 1 inhalation QD -continue Arnuity 200 at 1 inhalation QD -continue Singulair 10 mg QHS -continue Ventolin 2 puffs QHS -Asthma is believed to be caused by inherited (genetic) and environmental factor, but its exact cause is unknown. Asthma may be triggered by allergens, lung infections, or irritants in the air. Asthma triggers are different for each person -Inhaler technique reviewed as well as oral hygiene techniques reviewed with patient. Avoidance of cold air, extremes of temperature, rescue inhaler should be used before exercise. Order of medication reviewed with patient. Recommended use of a cool mist humidifier in the bedroom.  problem 2: GERD, hiatal hernia (active) -continue Protonix 40 mg QAM, pre-breakfast  -Rule of 2s: avoid eating too much, eating too late, eating too spicy, eating two hours before bed -Things to avoid including overeating, spicy foods, tight clothing, eating within three hours of bed, this list is not all inclusive. -For treatment of reflux, possible options discussed including diet control, H2 blockers, PPIs, as well as coating motility agents discussed as treatment options. Timing of meals and proximity of last meal to sleep were discussed. If symptoms persist, a formal gastrointestinal evaluation is needed.  problem 3: primary snoring  -recommended to use Oxy Aid, "Slumber Bump" and "Somnifix" Good sleep hygiene was encouraged including avoiding watching television an hour before bed, keeping caffeine at a low, avoiding reading, television, or anything, in bed, no drinking any liquids three hours before bedtime, and only getting into bed when tired and ready for sleep.  Problem 3A: hypoxemia ?OSAS -complete home sleep study -Sleep apnea is associated with adverse clinical consequences which can affect most organ systems. Cardiovascular disease risk includes arrhythmias, atrial fibrillation, hypertension, coronary artery disease, and stroke. Metabolic disorders include diabetes type 2, non-alcoholic fatty liver disease. Mood disorder especially depression; and cognitive decline especially in the elderly. Associations with chronic reflux/Cottrell's esophagus some but not all inclusive. -Reasons include arousal consistent with hypopnea; respiratory events most prominent in REM sleep or supine position; therefore sleep staging and body position are important for accurate diagnosis and estimation of AHI.  problem 4: poor sleep- improved -f/u with psychiatrist -recommended Sleep Guard  problem 5: allergies -continue Clarinex 5 mg QHS -Environmental measures for allergies were encouraged including mattress and pillow cover, air purifier, and environmental controls.  problem 6: poor breathing mechanics (Wim Hof / Joi) -recommended Calm Harry -Proper breathing techniques were reviewed with an emphasis of exhalation. Patient instructed to breath in for 1 second and out for four seconds. Patient was encouraged to not talk while walking.  problem 7: obesity - need to improve -recommended Berberine OTC supplement for visceral fat loss  -Weight loss, exercise, and diet control were discussed and are highly encouraged. Treatment options were given such as, aqua therapy, and contacting a nutritionist. Recommended to use the elliptical, stationary bike, less use of treadmill. Obesity is associated with worsening asthma, shortness of breath, and potential for cardiac disease, diabetes, and other underlying medical conditions.  problem 8: anxiety -recommended to see Dr. Gu -recommended to read "The Gift of Maybe"  Problem 9: Health Maintenance/COVID19 Precautions: -s/p COVID-19 7/2022 -S/p Covid 19 vaccine (Moderna) x2 - Clean your hands often. Wash your hands often with soap and water for at least 20 seconds, especially after blowing your nose, coughing, or sneezing, or having been in a public place. - If soap and water are not available, use a hand  that contains at least 60% alcohol. - To the extent possible, avoid touching high-touch surfaces in public places - elevator buttons, door handles, handrails, handshaking with people, etc. Use a tissue or your sleeve to cover your hand or finger if you must touch something. - Wash your hands after touching surfaces in public places. - Avoid touching your face, nose, eyes, etc. - Clean and disinfect your home to remove germs: practice routine cleaning of frequently touched surfaces (for example: tables, doorknobs, light switches, handles, desks, toilets, faucets, sinks & cell phones) - Avoid crowds, especially in poorly ventilated spaces. Your risk of exposure to respiratory viruses like COVID-19 may increase in crowded, closed-in settings with little air circulation if there are people in the crowd who are sick. All patients are recommended to practice social distancing and stay at least 6 feet away from others. - Avoid all non-essential travel including plane trips, and especially avoid embarking on cruise ships. -If COVID-19 is spreading in your community, take extra measures to put distance between yourself and other people to further reduce your risk of being exposed to this new virus. -Stay home as much as possible. - Consider ways of getting food brought to your house through family, social, or commercial networks -Be aware that the virus has been known to live in the air up to 3 hours post exposure, cardboard up to 24 hours post exposure, copper up to 4 hours post exposure, steel and plastic up to 2-3 days post exposure. Risk of transmission from these surfaces are affected by many variables. Immune Support Recommendations: -OTC Vitamin C 500mg BID -OTC Quercetin 250-500mg BID -OTC Zinc 75-100mg per day -OTC Melatonin 1 or 2 mg a night -OTC Vitamin D 1-4000mg per day -OTC Tonic Water 8oz per day Asthma and COVID19: You need to make sure your asthma is under control. This often requires the use of inhaled corticosteroids (and sometimes oral corticosteroids). Inhaled corticosteroids do not likely reduce your immune system's ability to fight infections, but oral corticosteroids may. It is important to use the steps above to protect yourself to limit your exposure to any respiratory virus.  problem 10: health maintenance -recommended Well [facility] and cancer screening in the face of BRCA positive (Hematology evaluation pending) -recommended yearly flu shot 2023- refused -recommended strep pneumonia vaccines: Prevnar-13 vaccine, followed by Pneumo vaccine 23 on year following -recommended early intervention for URIs -recommended osteoporosis evaluations -recommended early dermatological evaluations -recommended after the age of 50 to the age of 70, colonoscopy every 5 years  F/P in 4 months - [facility] He is encouraged to call with any changes, concerns, or questions.

## 2024-09-12 NOTE — HISTORY OF PRESENT ILLNESS
[Home] : at home, [unfilled] , at the time of the visit. [Medical Office: ([facility])___] : at the medical office located in  [Verbal consent obtained from patient] : the patient, [unfilled] [FreeTextEntry1] : Mr. Lombardo is a 58 year old male with a history of abnormal chest CT/PFT, allergic rhinitis, asthma, fibrosis lung, GERD, obesity, poor sleep, primary snoring, and SOB presenting to the office today via video call for a follow-up pulmonary evaluation. His chief complaint is   -he notes s/p hospital visit 3 days, he had BP of 145/111 which they believed was anxiety -he notes when sleeping at hospital his SpO2 would drop below 90% -he denies exercising -he notes losing weight, 10 pounds -he notes some chest tightness with his anxiety -he notes some sour taste in his mouth when taking Lexapro -he notes bowels are regular -he notes not getting enough sleep, due to thyroid problems (nodule) or anxiety -he notes poor quality of sleep -he notes light tremors upon waking up -he notes when in the hospital at rest, his SpO2 would fluctuate rapidly  -he denies any headaches, nausea, emesis, fever, chills, sweats, chest pain, chest pressure, coughing, wheezing, palpitations, diarrhea, constipation, dysphagia, vertigo, arthralgias, myalgias, leg swelling, itchy eyes, itchy ears, heartburn, reflux.

## 2024-09-12 NOTE — REASON FOR VISIT
[Follow-Up] : a follow-up visit [FreeTextEntry1] : via video call abnormal chest CT/PFT, allergic rhinitis, asthma, fibrosis lung, GERD, obesity, poor sleep, primary snoring, and SOB

## 2024-09-21 NOTE — PHYSICAL EXAM
[None] : none [Cooperative] : cooperative [Anxious] : anxious [Clear] : clear [Linear/Goal Directed] : linear/goal directed [None Reported] : none reported [WNL] : within normal limits [Average] : average [FreeTextEntry8] : "still anxious"  [de-identified] : slightly constricted [FreeTextEntry7] : No SI/HI

## 2024-09-21 NOTE — DISCUSSION/SUMMARY
[FreeTextEntry1] : The patient is a 53-year-old male with anxiety disorder, likely generalized anxiety disorder and panic attacks.   10/16/2020: Patient's father  2 weeks ago, he is sad but states he is coping. he states he did not switch to Prozac as he was busy taking care of his father, and there is not change with increased sweating and poor heat tolerance and this is limiting his ability to exercise and not able to lose weight and wants to start now.   2020: Patient reports mild episodes of anxiety, overall stable, less tired with Prozac, will continue same does   12/15/2020: Patient reports mild, but persistent and frequent episodes of anxiety, no panic attacks, tolerating Prozac, will increase dose  2021: Patient reports about 10 days after increasing Prozac dose he is excessively tired in the day and has trouble waking up and is not productive, though states he is not depressed   3/10/2021: Patient reports since December he has been excessively tired in the day, no change with reducing Prozac dose, denies feeling depressed or anxious   2021: Patient reports excessive daytime fatigue significantly improved by changing the timing of Prozac. He denies feeling depressed or anxious.   2021: Patient reports no sx of anxiety or depression since last visit.   10/11/2021: Patient reports slight increase in anxiety recently because of the pain and also feeling that he is getting overwhelmed and feeling stuck in his life. Discussed possibility of increasing Prozac which we had done last year but he felt extremely fatigued at that time. Patient reports he does not want to make any changes at this time and would like to continue with the same dose of medications.  2021: Patient reports he continues to have random, brief moments of anxiety, but overall anxiety sx in good control, not interfering with his daily functioning.   3/7/2022: Patient states anxiety is in good control, not depressed. He states he needs to get an MRI for his ankle, and coordinating to get clearances for doing ti under anesthesia has been challenging and asks if he could take his Xanax or another med. States he took Xanax 2 tabs, last time, had minimal benefit, and could take up to 1 mg, half mg 1 hr before and 0.5 mg at the time of the procedure, he plans to have a friend drive him to the appointment.   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   10/3/2022: Patient reported no change in anxiety since last visit.   2023: Patient reported that anxiety symptoms remain in good control and he had a concussion a few weeks ago and brain imaging was negative but had post concussion symptoms which he reports are improving in the past couple of weeks.  2023:  Patient continues to remain stable and anxiety symptoms remain in good control  10/23/2023: Patient reports mild anxiety since last visit, not on gabapentin for past 6 weeks otherwise in general stable.  2024: The patient reported a slight increase in caregiver stress, and he is coping well and stable   2024: Patient is reporting increased anxiety related to recent experience with vertigo and feeling like his whole body is vibrating and so far evaluation by his PCP and ENT did not find any etiology to explain these physical symptoms, he had a neurological workup couple years ago when the MRI is pending.  Given that the patient is experiencing increased anxiety and trouble sleeping we will recommend increasing gabapentin dose for increased therapeutic benefit. Previously when fluoxetine dose was higher than 30 mg/day he felt more tired and preferred to lower the dose. Patient advised to call office for Xanax Rx.  2024: The patient reported still feeling anxious and frustrated and increased dose of gabapentin is not effective.  Past med trials reviewed: Duloxetine in 2017 before switching to Prozac and higher dose of Prozac made him feel lethargic. Other meds in the past: Lexapro- stopped being effective, Zoloft, risperidone, lithium. He thinks he may have been on Effexor but does not remember the effect and or duration and agrees to a retrial.   2024: The patient states anxiety symptoms are about 90% gone with switching to venlafaxine, however he started to experience blurred vision and that is not improving. He states he would like to switch to another med but would like to start tapering to be off meds before a test ENT specialist is recommending, and for which he states he needs to off meds, and instead of resuming venlafaxine then we will plan to start desvenlafaxine, for a better side effect profile and similar mechanism of action.   2024: Patient reports that he is experiencing withdrawal symptoms from venlafaxine but did not yet start Pristiq because it was an issue with the prescription and he feels that the anxiety is related to having vertigo or other physical symptoms and at this point he feels that the withdrawal from venlafaxine is mild and he can tolerate as there has been improvement day by day and he does not want to start Pristiq because he wants to try the new compound medication for vertigo and he feels that if the vertigo is better his mood and anxiety symptoms will be much better controlled as well.  2024: Patient states he is continuing to experience discontinuation withdrawal symptoms from venlafaxine and the new compound drug he was rxde is helping with vertigo and his vision is better and would like to go back on Lexapro which he took previously, had no side effects as he is concerned about increased adverse effects with trying a new medication.   2024: The patient states within 2 days of starting Lexapro withdrawal symptoms from venlafaxine have stopped, however he is still feeling anxious, and though he was not supposed to take Xanax, went to ER 2x, as he thought he was having a heart attack and his BP was elevated, cardiac work up was negative, and he was given Ativan with some benefit.  2024: The patient reported no side effects from escitalopram and there is only minimal reduction of anxiety and would benefit from increasing escitalopram dose for increase therapeutic benefit.

## 2024-09-21 NOTE — PHYSICAL EXAM
[None] : none [Cooperative] : cooperative [Anxious] : anxious [Clear] : clear [Linear/Goal Directed] : linear/goal directed [None Reported] : none reported [WNL] : within normal limits [Average] : average [FreeTextEntry8] : "still anxious"  [de-identified] : slightly constricted [FreeTextEntry7] : No SI/HI

## 2024-09-21 NOTE — PLAN
[No] : No [Medication education provided] : Medication education provided. [Rationale for medication choices, possible risks/precautions, benefits, alternative treatment choices, and consequences of non-treatment discussed] : Rationale for medication choices, possible risks/precautions, benefits, alternative treatment choices, and consequences of non-treatment discussed with patient/family/caregiver  [FreeTextEntry5] : psychoeducation and supportive therapy provided and discussed rationale for med changes Increase Lexapro 15 mg/day Continue Xanax 0.25 mg AM daily and take 1 tab as needed; will plan to taper and stop daily Xanax when there is signifcant reduction of anxiety Educated patient of importance of being and remaining abstinent from drugs and alcohol.  Emergency procedures were discussed: pt. educated to call 911 or go to nearest ER for worsening of symptoms/suicidal/homicidal ideation.  RTC in 2 weeks or earlier as needed.  Patient expressed understanding and agreement with above plan.   I stop checked: Reference #: [id number]  Practitioner Count: 0 Pharmacy Count: 0 Current Opioid Prescriptions: 0 Current Benzodiazepine Prescriptions: 0 Current Stimulant Prescriptions: 0   Patient Demographic Information (PDI)     PDI	First Name	Last Name	Birth Date	Gender	Street Address	[facility] Code A	John	Lombardo	1965	Male	[address]	[facility]	[address]  Prescription Information PDI	My Rx	Current Rx	Drug Type	Rx Written	Rx Dispensed	Drug	Quantity	Days Supply	Prescriber Name	Prescriber MAVIS #	Payment Method	Dispenser A	Y	N	B	06/12/2024	06/13/2024	alprazolam 0.25 mg tablet	30	30	Kasey Gu	[id number]	Medicare	[id number] A	Y	N	B	04/23/2024	04/24/2024	alprazolam 0.25 mg tablet	30	30	Kasey Gu	[id number]	Medicare	[id number]

## 2024-09-21 NOTE — HISTORY OF PRESENT ILLNESS
[FreeTextEntry1] : Med changes made on last visit: Continue Lexapro 10 mg/day, use Xanax 0.25 mg BID prn The patient states he is taking Xanax 0.25 mg daily AM and PM as needed He rates anxiety at 5/10, 10 being worse. States he is able to get some things done, and last 2 nights he was able to sleep all night. He states he has been drinking chamomile tea.   Vertigo/dizziness remains much reduced. He stopped compounded med for vertigo, gave him nausea.  He continues to have occasional lightheadedness and still has vision changes, and he is not comfortable driving.  He denied feeling depressed.  He denied passive/active SI He denied hearing voices or noise.  He continues to have lower and upper back pain.  Adherence to med regimen: Good  [FreeTextEntry2] : Duloxetine in 2017 before switching to Prozac and higher dose of Prozac made him feel lethargic. Other meds in the past: Lexapro- stopped being effective, Zoloft, risperidone, lithium. He thinks he may have been on Effexor but does not remember the effect and or duration.

## 2024-09-21 NOTE — REASON FOR VISIT
[Patient preference] : as per patient preference [Telehealth (audio & video) - Individual/Group] : This visit was provided via telehealth using real-time 2-way audio visual technology. [Other Location: e.g. Home (Enter Location, City,State)___] : The provider was located at [unfilled]. [Home] : The patient, [unfilled], was located at home, [unfilled], at the time of the visit. [Participant(s) identity verified] : Participant(s) identity verified. [Verbal consent obtained from patient/other participant(s)] : Verbal consent for telehealth/telephonic services obtained from patient/other participant(s) [Patient] : Patient [FreeTextEntry1] : anxiety

## 2024-09-21 NOTE — PLAN
[No] : No [Medication education provided] : Medication education provided. [Rationale for medication choices, possible risks/precautions, benefits, alternative treatment choices, and consequences of non-treatment discussed] : Rationale for medication choices, possible risks/precautions, benefits, alternative treatment choices, and consequences of non-treatment discussed with patient/family/caregiver  [FreeTextEntry5] : psychoeducation and supportive therapy provided and discussed rationale for med changes Increase Lexapro 15 mg/day Continue Xanax 0.25 mg AM daily and take 1 tab as needed; will plan to taper and stop daily Xanax when there is signifcant reduction of anxiety Educated patient of importance of being and remaining abstinent from drugs and alcohol.  Emergency procedures were discussed: pt. educated to call 911 or go to nearest ER for worsening of symptoms/suicidal/homicidal ideation.  RTC in 2 weeks or earlier as needed.  Patient expressed understanding and agreement with above plan.   I stop checked: Reference #: [id number]  Practitioner Count: 0 Pharmacy Count: 0 Current Opioid Prescriptions: 0 Current Benzodiazepine Prescriptions: 0 Current Stimulant Prescriptions: 0   Patient Demographic Information (PDI)     PDI	First Name	Last Name	Birth Date	Gender	Street Address	[facility] Code A	John	Lombardo	1965	Male	[address]	[facility]	[address]  Prescription Information PDI	My Rx	Current Rx	Drug Type	Rx Written	Rx Dispensed	Drug	Quantity	Days Supply	Prescriber Name	Prescriber MAVIS #	Payment Method	Dispenser A	Y	N	B	06/12/2024	06/13/2024	alprazolam 0.25 mg tablet	30	30	Kasey Gu	[id number]	Medicare	[id number] A	Y	N	B	04/23/2024	04/24/2024	alprazolam 0.25 mg tablet	30	30	Kasey Gu	[id number]	Medicare	[id number]

## 2024-09-21 NOTE — PLAN
[No] : No [Medication education provided] : Medication education provided. [Rationale for medication choices, possible risks/precautions, benefits, alternative treatment choices, and consequences of non-treatment discussed] : Rationale for medication choices, possible risks/precautions, benefits, alternative treatment choices, and consequences of non-treatment discussed with patient/family/caregiver  [FreeTextEntry5] : psychoeducation and supportive therapy provided and discussed rationale for med changes Increase Lexapro 15 mg/day Continue Xanax 0.25 mg AM daily and take 1 tab as needed; will plan to taper and stop daily Xanax when there is signifcant reduction of anxiety Educated patient of importance of being and remaining abstinent from drugs and alcohol.  Emergency procedures were discussed: pt. educated to call 911 or go to nearest ER for worsening of symptoms/suicidal/homicidal ideation.  RTC in 2 weeks or earlier as needed.  Patient expressed understanding and agreement with above plan.   I stop checked: Reference #: [id number]  Practitioner Count: 0 Pharmacy Count: 0 Current Opioid Prescriptions: 0 Current Benzodiazepine Prescriptions: 0 Current Stimulant Prescriptions: 0   Patient Demographic Information (PDI)     PDI	First Name	Last Name	Birth Date	Gender	Street Address	[facility] Code A	John	Lombardo	1965	Male	[address]	[facility]	[address]  Prescription Information PDI	My Rx	Current Rx	Drug Type	Rx Written	Rx Dispensed	Drug	Quantity	Days Supply	Prescriber Name	Prescriber MAVIS #	Payment Method	Dispenser A	Y	N	B	06/12/2024	06/13/2024	alprazolam 0.25 mg tablet	30	30	Kasey Gu	[id number]	Medicare	[id number] A	Y	N	B	04/23/2024	04/24/2024	alprazolam 0.25 mg tablet	30	30	Kasey Gu	[id number]	Medicare	[id number]

## 2024-09-21 NOTE — PHYSICAL EXAM
[None] : none [Cooperative] : cooperative [Anxious] : anxious [Clear] : clear [Linear/Goal Directed] : linear/goal directed [None Reported] : none reported [WNL] : within normal limits [Average] : average [FreeTextEntry8] : "still anxious"  [de-identified] : slightly constricted [FreeTextEntry7] : No SI/HI

## 2024-09-24 ENCOUNTER — APPOINTMENT (OUTPATIENT)
Dept: PSYCHIATRY | Facility: CLINIC | Age: 59
End: 2024-09-24
Payer: MEDICARE

## 2024-09-24 DIAGNOSIS — F41.9 ANXIETY DISORDER, UNSPECIFIED: ICD-10-CM

## 2024-09-24 PROCEDURE — G2211 COMPLEX E/M VISIT ADD ON: CPT

## 2024-09-24 PROCEDURE — 99214 OFFICE O/P EST MOD 30 MIN: CPT

## 2024-09-29 NOTE — PLAN
[No] : No [Medication education provided] : Medication education provided. [Rationale for medication choices, possible risks/precautions, benefits, alternative treatment choices, and consequences of non-treatment discussed] : Rationale for medication choices, possible risks/precautions, benefits, alternative treatment choices, and consequences of non-treatment discussed with patient/family/caregiver  [FreeTextEntry5] : psychoeducation and supportive therapy provided and discussed rationale for med changes and also explained to the patient that daily scheduled Xanax is short-term until Lexapro is more effective in managing his anxiety symptoms. Increase Lexapro 20 mg/day Continue Xanax 0.25 mg AM daily and take 1 tab as needed; will plan to taper and stop daily Xanax when there is significant reduction of anxiety Educated patient of importance of being and remaining abstinent from drugs and alcohol.  Emergency procedures were discussed: pt. educated to call 911 or go to nearest ER for worsening of symptoms/suicidal/homicidal ideation.  RTC in 2-3 weeks or earlier as needed.  Patient expressed understanding and agreement with above plan.   I stop checked: Reference #: [id number]  Practitioner Count: 1 Pharmacy Count: 1 Current Opioid Prescriptions: 0 Current Benzodiazepine Prescriptions: 1 Current Stimulant Prescriptions: 0   Patient Demographic Information (PDI)     PDI	First Name	Last Name	Birth Date	Gender	Street Address	[facility]	Zip Code A	John	Lombardo	1965	Male	[address]	[facility]	[address]  Prescription Information    PDI Filter:   PDI	My Rx	Current Rx	Drug Type	Rx Written	Rx Dispensed	Drug	Quantity	Days Supply	Prescriber Name	Prescriber MAVIS #	Payment Method	Dispenser A	Y	Y	B	09/11/2024	09/14/2024	alprazolam 0.25 mg tablet	60	30	Kasey Gu	[id number]	Medicare	[id number] A	Y	N	B	06/12/2024	06/13/2024	alprazolam 0.25 mg tablet	30	30	Kasey Gu	[id number]	Medicare	[id number] A	Y	N	B	04/23/2024	04/24/2024	alprazolam 0.25 mg tablet	30	30	Kasey Gu	[id number]	Medicare	[id number]

## 2024-09-29 NOTE — PLAN
[No] : No [Medication education provided] : Medication education provided. [Rationale for medication choices, possible risks/precautions, benefits, alternative treatment choices, and consequences of non-treatment discussed] : Rationale for medication choices, possible risks/precautions, benefits, alternative treatment choices, and consequences of non-treatment discussed with patient/family/caregiver  [FreeTextEntry5] : psychoeducation and supportive therapy provided and discussed rationale for med changes and also explained to the patient that daily scheduled Xanax is short-term until Lexapro is more effective in managing his anxiety symptoms. Increase Lexapro 20 mg/day Continue Xanax 0.25 mg AM daily and take 1 tab as needed; will plan to taper and stop daily Xanax when there is significant reduction of anxiety Educated patient of importance of being and remaining abstinent from drugs and alcohol.  Emergency procedures were discussed: pt. educated to call 911 or go to nearest ER for worsening of symptoms/suicidal/homicidal ideation.  RTC in 2-3 weeks or earlier as needed.  Patient expressed understanding and agreement with above plan.   I stop checked: Reference #: [id number]  Practitioner Count: 1 Pharmacy Count: 1 Current Opioid Prescriptions: 0 Current Benzodiazepine Prescriptions: 1 Current Stimulant Prescriptions: 0   Patient Demographic Information (PDI)     PDI	First Name	Last Name	Birth Date	Gender	Street Address	[facility]	Zip Code A	John	Lombardo	1965	Male	[address]	[facility]	[address]  Prescription Information    PDI Filter:   PDI	My Rx	Current Rx	Drug Type	Rx Written	Rx Dispensed	Drug	Quantity	Days Supply	Prescriber Name	Prescriber MAVIS #	Payment Method	Dispenser A	Y	Y	B	09/11/2024	09/14/2024	alprazolam 0.25 mg tablet	60	30	Kasey Gu	[id number]	Medicare	[id number] A	Y	N	B	06/12/2024	06/13/2024	alprazolam 0.25 mg tablet	30	30	Kasey Gu	[id number]	Medicare	[id number] A	Y	N	B	04/23/2024	04/24/2024	alprazolam 0.25 mg tablet	30	30	Kasey Gu	[id number]	Medicare	[id number]

## 2024-09-29 NOTE — PHYSICAL EXAM
[None] : none [Average] : average [Cooperative] : cooperative [Clear] : clear [Linear/Goal Directed] : linear/goal directed [None Reported] : none reported [WNL] : within normal limits [FreeTextEntry8] : "still anxious"  [FreeTextEntry7] : No SI/HI [de-identified] : slightly constricted

## 2024-09-29 NOTE — PHYSICAL EXAM
[None] : none [Average] : average [Cooperative] : cooperative [Clear] : clear [Linear/Goal Directed] : linear/goal directed [None Reported] : none reported [WNL] : within normal limits [FreeTextEntry8] : "still anxious"  [FreeTextEntry7] : No SI/HI [de-identified] : slightly constricted

## 2024-09-29 NOTE — DISCUSSION/SUMMARY
[FreeTextEntry1] : The patient is a 53-year-old male with anxiety disorder, likely generalized anxiety disorder and panic attacks.   10/16/2020: Patient's father  2 weeks ago, he is sad but states he is coping. he states he did not switch to Prozac as he was busy taking care of his father, and there is not change with increased sweating and poor heat tolerance and this is limiting his ability to exercise and not able to lose weight and wants to start now.   2020: Patient reports mild episodes of anxiety, overall stable, less tired with Prozac, will continue same does   12/15/2020: Patient reports mild, but persistent and frequent episodes of anxiety, no panic attacks, tolerating Prozac, will increase dose  2021: Patient reports about 10 days after increasing Prozac dose he is excessively tired in the day and has trouble waking up and is not productive, though states he is not depressed   3/10/2021: Patient reports since December he has been excessively tired in the day, no change with reducing Prozac dose, denies feeling depressed or anxious   2021: Patient reports excessive daytime fatigue significantly improved by changing the timing of Prozac. He denies feeling depressed or anxious.   2021: Patient reports no sx of anxiety or depression since last visit.   10/11/2021: Patient reports slight increase in anxiety recently because of the pain and also feeling that he is getting overwhelmed and feeling stuck in his life. Discussed possibility of increasing Prozac which we had done last year but he felt extremely fatigued at that time. Patient reports he does not want to make any changes at this time and would like to continue with the same dose of medications.  2021: Patient reports he continues to have random, brief moments of anxiety, but overall anxiety sx in good control, not interfering with his daily functioning.   3/7/2022: Patient states anxiety is in good control, not depressed. He states he needs to get an MRI for his ankle, and coordinating to get clearances for doing ti under anesthesia has been challenging and asks if he could take his Xanax or another med. States he took Xanax 2 tabs, last time, had minimal benefit, and could take up to 1 mg, half mg 1 hr before and 0.5 mg at the time of the procedure, he plans to have a friend drive him to the appointment.   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   10/3/2022: Patient reported no change in anxiety since last visit.   2023: Patient reported that anxiety symptoms remain in good control and he had a concussion a few weeks ago and brain imaging was negative but had post concussion symptoms which he reports are improving in the past couple of weeks.  2023:  Patient continues to remain stable and anxiety symptoms remain in good control  10/23/2023: Patient reports mild anxiety since last visit, not on gabapentin for past 6 weeks otherwise in general stable.  2024: The patient reported a slight increase in caregiver stress, and he is coping well and stable   2024: Patient is reporting increased anxiety related to recent experience with vertigo and feeling like his whole body is vibrating and so far evaluation by his PCP and ENT did not find any etiology to explain these physical symptoms, he had a neurological workup couple years ago when the MRI is pending.  Given that the patient is experiencing increased anxiety and trouble sleeping we will recommend increasing gabapentin dose for increased therapeutic benefit. Previously when fluoxetine dose was higher than 30 mg/day he felt more tired and preferred to lower the dose. Patient advised to call office for Xanax Rx.  2024: The patient reported still feeling anxious and frustrated and increased dose of gabapentin is not effective.  Past med trials reviewed: Duloxetine in 2017 before switching to Prozac and higher dose of Prozac made him feel lethargic. Other meds in the past: Lexapro- stopped being effective, Zoloft, risperidone, lithium. He thinks he may have been on Effexor but does not remember the effect and or duration and agrees to a retrial.   2024: The patient states anxiety symptoms are about 90% gone with switching to venlafaxine, however he started to experience blurred vision and that is not improving. He states he would like to switch to another med but would like to start tapering to be off meds before a test ENT specialist is recommending, and for which he states he needs to off meds, and instead of resuming venlafaxine then we will plan to start desvenlafaxine, for a better side effect profile and similar mechanism of action.   2024: Patient reports that he is experiencing withdrawal symptoms from venlafaxine but did not yet start Pristiq because it was an issue with the prescription and he feels that the anxiety is related to having vertigo or other physical symptoms and at this point he feels that the withdrawal from venlafaxine is mild and he can tolerate as there has been improvement day by day and he does not want to start Pristiq because he wants to try the new compound medication for vertigo and he feels that if the vertigo is better his mood and anxiety symptoms will be much better controlled as well.  2024: Patient states he is continuing to experience discontinuation withdrawal symptoms from venlafaxine and the new compound drug he was rxde is helping with vertigo and his vision is better and would like to go back on Lexapro which he took previously, had no side effects as he is concerned about increased adverse effects with trying a new medication.   2024: The patient states within 2 days of starting Lexapro withdrawal symptoms from venlafaxine have stopped, however he is still feeling anxious, and though he was not supposed to take Xanax, went to ER 2x, as he thought he was having a heart attack and his BP was elevated, cardiac work up was negative, and he was given Ativan with some benefit.  2024: The patient reported no side effects from escitalopram and there is only minimal reduction of anxiety and would benefit from increasing escitalopram dose for increase therapeutic benefit.   2024: Patient reported slight but further incremental improvement of anxiety and depression symptoms however appears to have plateaued and would need a higher dose, will increase Lexapro dose to use 20 mg daily and continue with the Xanax 0.5 mg twice a day.

## 2024-09-29 NOTE — REASON FOR VISIT
[Patient preference] : as per patient preference [Telehealth (audio & video) - Individual/Group] : This visit was provided via telehealth using real-time 2-way audio visual technology. [Medical Office: ([facility])___] : The provider was located at the medical office in [unfilled]. [Home] : The patient, [unfilled], was located at home, [unfilled], at the time of the visit. [Participant(s) identity verified] : Participant(s) identity verified. [Verbal consent obtained from patient/other participant(s)] : Verbal consent for telehealth/telephonic services obtained from patient/other participant(s) [Patient] : Patient [Patient's space is appropriate for telehealth and maintains privacy/confidentiality.] : Patient's space is appropriate for telehealth and maintains privacy/confidentiality. [FreeTextEntry1] : anxiety

## 2024-09-29 NOTE — PLAN
[No] : No [Medication education provided] : Medication education provided. [Rationale for medication choices, possible risks/precautions, benefits, alternative treatment choices, and consequences of non-treatment discussed] : Rationale for medication choices, possible risks/precautions, benefits, alternative treatment choices, and consequences of non-treatment discussed with patient/family/caregiver  [FreeTextEntry5] : psychoeducation and supportive therapy provided and discussed rationale for med changes and also explained to the patient that daily scheduled Xanax is short-term until Lexapro is more effective in managing his anxiety symptoms. Increase Lexapro 20 mg/day Continue Xanax 0.25 mg AM daily and take 1 tab as needed; will plan to taper and stop daily Xanax when there is significant reduction of anxiety Educated patient of importance of being and remaining abstinent from drugs and alcohol.  Emergency procedures were discussed: pt. educated to call 911 or go to nearest ER for worsening of symptoms/suicidal/homicidal ideation.  RTC in 2-3 weeks or earlier as needed.  Patient expressed understanding and agreement with above plan.   I stop checked: Reference #: [id number]  Practitioner Count: 1 Pharmacy Count: 1 Current Opioid Prescriptions: 0 Current Benzodiazepine Prescriptions: 1 Current Stimulant Prescriptions: 0   Patient Demographic Information (PDI)     PDI	First Name	Last Name	Birth Date	Gender	Street Address	[facility]	Zip Code [place]	John	Lombardo	1965	Male	[address]	[facility]	[address]  Prescription Information    PDI Filter:   PDI	My Rx	Current Rx	Drug Type	Rx Written	Rx Dispensed	Drug	Quantity	Days Supply	Prescriber Name	Prescriber MAVIS #	Payment Method	Dispenser A	Y	Y	B	09/11/2024	09/14/2024	alprazolam 0.25 mg tablet	60	30	Kasey Gu	[id number]	Medicare	[id number] A	Y	N	B	06/12/2024	06/13/2024	alprazolam 0.25 mg tablet	30	30	Kasey Gu	[id number]	Medicare	[id number] A	Y	N	B	04/23/2024	04/24/2024	alprazolam 0.25 mg tablet	30	30	Kasey Gu	[id number]	Medicare	[id number]

## 2024-09-29 NOTE — REASON FOR VISIT
[Patient preference] : as per patient preference [Telehealth (audio & video) - Individual/Group] : This visit was provided via telehealth using real-time 2-way audio visual technology. [Medical Office: ([facility])___] : The provider was located at the medical office in [unfilled]. [Home] : The patient, [unfilled], was located at home, [unfilled], at the time of the visit. [Participant(s) identity verified] : Participant(s) identity verified. [Verbal consent obtained from patient/other participant(s)] : Verbal consent for telehealth/telephonic services obtained from patient/other participant(s) [Patient] : Patient [Patient's space is appropriate for telehealth and maintains privacy/confidentiality.] : Patient's space is appropriate for telehealth and maintains privacy/confidentiality. [FreeTextEntry1] : anxiety

## 2024-09-29 NOTE — PHYSICAL EXAM
[None] : none [Average] : average [Cooperative] : cooperative [Clear] : clear [Linear/Goal Directed] : linear/goal directed [None Reported] : none reported [WNL] : within normal limits [FreeTextEntry8] : "still anxious"  [FreeTextEntry7] : No SI/HI [de-identified] : slightly constricted

## 2024-09-29 NOTE — REASON FOR VISIT
[Patient preference] : as per patient preference [Telehealth (audio & video) - Individual/Group] : This visit was provided via telehealth using real-time 2-way audio visual technology. [Medical Office: ([facility])___] : The provider was located at the medical office in [unfilled]. [Home] : The patient, [unfilled], was located at home, [unfilled], at the time of the visit. [Participant(s) identity verified] : Participant(s) identity verified. [Verbal consent obtained from patient/other participant(s)] : Verbal consent for telehealth/telephonic services obtained from patient/other participant(s) [Patient] : Patient [Patient's space is appropriate for telehealth and maintains privacy/confidentiality.] : Patient's space is appropriate for telehealth and maintains privacy/confidentiality. [FreeTextEntry1] : anxiety

## 2024-09-29 NOTE — HISTORY OF PRESENT ILLNESS
[FreeTextEntry1] : Med changes made on last visit: Increase Lexapro to 15 mg/day Patient states that after last visit he felt "little by little better" but by last Friday he started to feel like he is going backwards and Saturday and Sunday were difficult.  Patient states that before and on Friday his anxiety level was 5 out of 10, 10 being the worst but in the past 3 days it is gone up to 7 out of 10.  Patient reported that he is still not completed the sleep study.  Patient reported occasional panic attacks and before Friday he was trying to skip the morning Xanax but in the past 3 days he had to take it twice a day.  Patient states that his vision is better and he is also not feeling lightheaded he has some nausea off-and-on but his appetite is good.  Patient states he did lose about 15 pounds in about 6 to 8 weeks. He denied feeling depressed.  He denied passive/active SI He denied hearing voices or noise.  He continues to have lower and upper back pain.  Adherence to med regimen: Good  [FreeTextEntry2] : Duloxetine in 2017 before switching to Prozac and higher dose of Prozac made him feel lethargic. Other meds in the past: Lexapro- stopped being effective, Zoloft, risperidone, lithium. He thinks he may have been on Effexor but does not remember the effect and or duration.

## 2024-10-02 ENCOUNTER — APPOINTMENT (OUTPATIENT)
Dept: PULMONOLOGY | Facility: CLINIC | Age: 59
End: 2024-10-02
Payer: MEDICARE

## 2024-10-02 PROCEDURE — 99441: CPT | Mod: 93

## 2024-10-08 ENCOUNTER — APPOINTMENT (OUTPATIENT)
Dept: PSYCHIATRY | Facility: CLINIC | Age: 59
End: 2024-10-08
Payer: MEDICARE

## 2024-10-08 ENCOUNTER — APPOINTMENT (OUTPATIENT)
Dept: PULMONOLOGY | Facility: CLINIC | Age: 59
End: 2024-10-08
Payer: MEDICARE

## 2024-10-08 DIAGNOSIS — F41.9 ANXIETY DISORDER, UNSPECIFIED: ICD-10-CM

## 2024-10-08 PROCEDURE — 99441: CPT | Mod: 93

## 2024-10-08 PROCEDURE — 99213 OFFICE O/P EST LOW 20 MIN: CPT

## 2024-10-08 PROCEDURE — G2211 COMPLEX E/M VISIT ADD ON: CPT

## 2024-11-01 ENCOUNTER — APPOINTMENT (OUTPATIENT)
Dept: PSYCHIATRY | Facility: CLINIC | Age: 59
End: 2024-11-01

## 2024-11-01 DIAGNOSIS — F41.9 ANXIETY DISORDER, UNSPECIFIED: ICD-10-CM

## 2024-11-01 PROCEDURE — 99213 OFFICE O/P EST LOW 20 MIN: CPT

## 2024-11-22 ENCOUNTER — APPOINTMENT (OUTPATIENT)
Dept: PSYCHIATRY | Facility: CLINIC | Age: 59
End: 2024-11-22
Payer: MEDICARE

## 2024-11-22 DIAGNOSIS — F41.9 ANXIETY DISORDER, UNSPECIFIED: ICD-10-CM

## 2024-11-22 PROCEDURE — 99214 OFFICE O/P EST MOD 30 MIN: CPT

## 2024-11-22 RX ORDER — GABAPENTIN 100 MG/1
100 CAPSULE ORAL
Qty: 60 | Refills: 1 | Status: ACTIVE | COMMUNITY
Start: 2024-11-22 | End: 1900-01-01

## 2024-11-27 ENCOUNTER — APPOINTMENT (OUTPATIENT)
Dept: PULMONOLOGY | Facility: CLINIC | Age: 59
End: 2024-11-27
Payer: MEDICARE

## 2024-11-27 VITALS
WEIGHT: 232 LBS | OXYGEN SATURATION: 98 % | RESPIRATION RATE: 16 BRPM | SYSTOLIC BLOOD PRESSURE: 118 MMHG | BODY MASS INDEX: 35.16 KG/M2 | DIASTOLIC BLOOD PRESSURE: 78 MMHG | HEIGHT: 68 IN | HEART RATE: 72 BPM | TEMPERATURE: 97.3 F

## 2024-11-27 DIAGNOSIS — R06.02 SHORTNESS OF BREATH: ICD-10-CM

## 2024-11-27 DIAGNOSIS — R93.89 ABNORMAL FINDINGS ON DIAGNOSTIC IMAGING OF OTHER SPECIFIED BODY STRUCTURES: ICD-10-CM

## 2024-11-27 DIAGNOSIS — K21.9 GASTRO-ESOPHAGEAL REFLUX DISEASE W/OUT ESOPHAGITIS: ICD-10-CM

## 2024-11-27 DIAGNOSIS — R94.2 ABNORMAL RESULTS OF PULMONARY FUNCTION STUDIES: ICD-10-CM

## 2024-11-27 DIAGNOSIS — E66.9 OBESITY, UNSPECIFIED: ICD-10-CM

## 2024-11-27 DIAGNOSIS — J45.909 UNSPECIFIED ASTHMA, UNCOMPLICATED: ICD-10-CM

## 2024-11-27 PROCEDURE — 94729 DIFFUSING CAPACITY: CPT

## 2024-11-27 PROCEDURE — 94727 GAS DIL/WSHOT DETER LNG VOL: CPT

## 2024-11-27 PROCEDURE — 94010 BREATHING CAPACITY TEST: CPT

## 2024-11-27 PROCEDURE — 95012 NITRIC OXIDE EXP GAS DETER: CPT

## 2024-11-27 PROCEDURE — 99214 OFFICE O/P EST MOD 30 MIN: CPT | Mod: 25

## 2024-11-27 PROCEDURE — ZZZZZ: CPT

## 2024-12-30 RX ORDER — ALPRAZOLAM 0.25 MG/1
0.25 TABLET ORAL
Qty: 60 | Refills: 0 | Status: ACTIVE | COMMUNITY
Start: 2024-12-30 | End: 1900-01-01

## 2025-01-02 ENCOUNTER — APPOINTMENT (OUTPATIENT)
Dept: SURGERY | Facility: CLINIC | Age: 60
End: 2025-01-02
Payer: MEDICARE

## 2025-01-02 ENCOUNTER — LABORATORY RESULT (OUTPATIENT)
Age: 60
End: 2025-01-02

## 2025-01-02 VITALS
SYSTOLIC BLOOD PRESSURE: 122 MMHG | HEART RATE: 88 BPM | BODY MASS INDEX: 33.5 KG/M2 | HEIGHT: 70 IN | DIASTOLIC BLOOD PRESSURE: 78 MMHG | WEIGHT: 234 LBS

## 2025-01-02 DIAGNOSIS — Z80.3 FAMILY HISTORY OF MALIGNANT NEOPLASM OF BREAST: ICD-10-CM

## 2025-01-02 PROCEDURE — 31575 DIAGNOSTIC LARYNGOSCOPY: CPT

## 2025-01-02 PROCEDURE — 10005 FNA BX W/US GDN 1ST LES: CPT

## 2025-01-02 PROCEDURE — 99204 OFFICE O/P NEW MOD 45 MIN: CPT | Mod: 25

## 2025-01-03 ENCOUNTER — APPOINTMENT (OUTPATIENT)
Dept: PSYCHIATRY | Facility: CLINIC | Age: 60
End: 2025-01-03
Payer: MEDICARE

## 2025-01-03 DIAGNOSIS — F41.9 ANXIETY DISORDER, UNSPECIFIED: ICD-10-CM

## 2025-01-03 PROCEDURE — 99213 OFFICE O/P EST LOW 20 MIN: CPT

## 2025-01-06 DIAGNOSIS — E04.1 NONTOXIC SINGLE THYROID NODULE: ICD-10-CM

## 2025-02-06 ENCOUNTER — APPOINTMENT (OUTPATIENT)
Dept: ENDOCRINOLOGY | Facility: CLINIC | Age: 60
End: 2025-02-06
Payer: MEDICARE

## 2025-02-06 VITALS
HEART RATE: 84 BPM | DIASTOLIC BLOOD PRESSURE: 71 MMHG | SYSTOLIC BLOOD PRESSURE: 131 MMHG | BODY MASS INDEX: 34.72 KG/M2 | WEIGHT: 242 LBS

## 2025-02-06 DIAGNOSIS — F41.9 ANXIETY DISORDER, UNSPECIFIED: ICD-10-CM

## 2025-02-06 DIAGNOSIS — E04.1 NONTOXIC SINGLE THYROID NODULE: ICD-10-CM

## 2025-02-06 PROCEDURE — 99204 OFFICE O/P NEW MOD 45 MIN: CPT

## 2025-02-06 PROCEDURE — G2211 COMPLEX E/M VISIT ADD ON: CPT

## 2025-02-06 RX ORDER — PANTOPRAZOLE 40 MG/1
TABLET, DELAYED RELEASE ORAL
Refills: 0 | Status: ACTIVE | COMMUNITY

## 2025-02-13 LAB
T4 FREE SERPL-MCNC: 1.1 NG/DL
TSH SERPL-ACNC: 1.72 UIU/ML

## 2025-02-26 ENCOUNTER — APPOINTMENT (OUTPATIENT)
Dept: PSYCHIATRY | Facility: CLINIC | Age: 60
End: 2025-02-26
Payer: MEDICARE

## 2025-02-26 DIAGNOSIS — F41.9 ANXIETY DISORDER, UNSPECIFIED: ICD-10-CM

## 2025-02-26 PROCEDURE — 99214 OFFICE O/P EST MOD 30 MIN: CPT | Mod: 93

## 2025-02-26 RX ORDER — ESCITALOPRAM OXALATE 5 MG/1
5 TABLET ORAL
Qty: 30 | Refills: 1 | Status: ACTIVE | COMMUNITY
Start: 2025-02-26 | End: 1900-01-01

## 2025-03-07 RX ORDER — LEVALBUTEROL TARTRATE 45 UG/1
45 AEROSOL, METERED ORAL
Qty: 1 | Refills: 2 | Status: DISCONTINUED | COMMUNITY
Start: 2025-03-07 | End: 2025-03-07

## 2025-04-02 ENCOUNTER — APPOINTMENT (OUTPATIENT)
Dept: PSYCHIATRY | Facility: CLINIC | Age: 60
End: 2025-04-02
Payer: MEDICARE

## 2025-04-02 DIAGNOSIS — F41.9 ANXIETY DISORDER, UNSPECIFIED: ICD-10-CM

## 2025-04-02 PROCEDURE — 99213 OFFICE O/P EST LOW 20 MIN: CPT | Mod: 93

## 2025-04-03 ENCOUNTER — APPOINTMENT (OUTPATIENT)
Dept: PULMONOLOGY | Facility: CLINIC | Age: 60
End: 2025-04-03
Payer: MEDICARE

## 2025-04-03 ENCOUNTER — NON-APPOINTMENT (OUTPATIENT)
Age: 60
End: 2025-04-03

## 2025-04-03 VITALS
BODY MASS INDEX: 35.65 KG/M2 | DIASTOLIC BLOOD PRESSURE: 70 MMHG | HEIGHT: 70 IN | RESPIRATION RATE: 16 BRPM | SYSTOLIC BLOOD PRESSURE: 122 MMHG | OXYGEN SATURATION: 97 % | WEIGHT: 249 LBS | TEMPERATURE: 97.9 F | HEART RATE: 77 BPM

## 2025-04-03 DIAGNOSIS — E66.9 OBESITY, UNSPECIFIED: ICD-10-CM

## 2025-04-03 DIAGNOSIS — J45.901 ACUTE BRONCHITIS, UNSPECIFIED: ICD-10-CM

## 2025-04-03 DIAGNOSIS — R06.02 SHORTNESS OF BREATH: ICD-10-CM

## 2025-04-03 DIAGNOSIS — J20.9 ACUTE BRONCHITIS, UNSPECIFIED: ICD-10-CM

## 2025-04-03 DIAGNOSIS — J45.909 UNSPECIFIED ASTHMA, UNCOMPLICATED: ICD-10-CM

## 2025-04-03 DIAGNOSIS — R94.2 ABNORMAL RESULTS OF PULMONARY FUNCTION STUDIES: ICD-10-CM

## 2025-04-03 DIAGNOSIS — R93.89 ABNORMAL FINDINGS ON DIAGNOSTIC IMAGING OF OTHER SPECIFIED BODY STRUCTURES: ICD-10-CM

## 2025-04-03 DIAGNOSIS — J30.9 ALLERGIC RHINITIS, UNSPECIFIED: ICD-10-CM

## 2025-04-03 DIAGNOSIS — K21.9 GASTRO-ESOPHAGEAL REFLUX DISEASE W/OUT ESOPHAGITIS: ICD-10-CM

## 2025-04-03 PROCEDURE — 99214 OFFICE O/P EST MOD 30 MIN: CPT | Mod: 25

## 2025-04-03 PROCEDURE — 95012 NITRIC OXIDE EXP GAS DETER: CPT

## 2025-04-03 PROCEDURE — 94010 BREATHING CAPACITY TEST: CPT

## 2025-04-04 RX ORDER — SULFAMETHOXAZOLE AND TRIMETHOPRIM 800; 160 MG/1; MG/1
800-160 TABLET ORAL TWICE DAILY
Qty: 28 | Refills: 0 | Status: ACTIVE | COMMUNITY
Start: 2025-04-04 | End: 1900-01-01

## 2025-04-04 RX ORDER — AMOXICILLIN AND CLAVULANATE POTASSIUM 875; 125 MG/1; MG/1
875-125 TABLET, COATED ORAL
Qty: 20 | Refills: 0 | Status: DISCONTINUED | COMMUNITY
Start: 2025-04-03 | End: 2025-04-04

## 2025-04-09 DIAGNOSIS — G47.33 OBSTRUCTIVE SLEEP APNEA (ADULT) (PEDIATRIC): ICD-10-CM

## 2025-04-30 ENCOUNTER — OUTPATIENT (OUTPATIENT)
Dept: OUTPATIENT SERVICES | Facility: HOSPITAL | Age: 60
LOS: 1 days | Discharge: HOME | End: 2025-04-30

## 2025-04-30 DIAGNOSIS — R93.89 ABNORMAL FINDINGS ON DIAGNOSTIC IMAGING OF OTHER SPECIFIED BODY STRUCTURES: ICD-10-CM

## 2025-05-01 ENCOUNTER — APPOINTMENT (OUTPATIENT)
Dept: HEMATOLOGY ONCOLOGY | Facility: CLINIC | Age: 60
End: 2025-05-01
Payer: MEDICARE

## 2025-05-01 ENCOUNTER — NON-APPOINTMENT (OUTPATIENT)
Age: 60
End: 2025-05-01

## 2025-05-01 VITALS
HEART RATE: 56 BPM | RESPIRATION RATE: 16 BRPM | DIASTOLIC BLOOD PRESSURE: 78 MMHG | WEIGHT: 248.46 LBS | TEMPERATURE: 98.2 F | BODY MASS INDEX: 37.22 KG/M2 | OXYGEN SATURATION: 95 % | HEIGHT: 68.46 IN | SYSTOLIC BLOOD PRESSURE: 124 MMHG

## 2025-05-01 DIAGNOSIS — Z15.01 GENETIC SUSCEPTIBILITY TO MALIGNANT NEOPLASM OF BREAST: ICD-10-CM

## 2025-05-01 DIAGNOSIS — Z15.09 GENETIC SUSCEPTIBILITY TO MALIGNANT NEOPLASM OF BREAST: ICD-10-CM

## 2025-05-01 PROCEDURE — 99205 OFFICE O/P NEW HI 60 MIN: CPT

## 2025-05-16 ENCOUNTER — APPOINTMENT (OUTPATIENT)
Dept: PULMONOLOGY | Facility: CLINIC | Age: 60
End: 2025-05-16
Payer: MEDICARE

## 2025-05-16 VITALS
HEART RATE: 81 BPM | DIASTOLIC BLOOD PRESSURE: 68 MMHG | SYSTOLIC BLOOD PRESSURE: 120 MMHG | RESPIRATION RATE: 16 BRPM | WEIGHT: 250 LBS | HEIGHT: 68 IN | OXYGEN SATURATION: 96 % | TEMPERATURE: 97.2 F | BODY MASS INDEX: 37.89 KG/M2

## 2025-05-16 DIAGNOSIS — J30.9 ALLERGIC RHINITIS, UNSPECIFIED: ICD-10-CM

## 2025-05-16 DIAGNOSIS — G47.33 OBSTRUCTIVE SLEEP APNEA (ADULT) (PEDIATRIC): ICD-10-CM

## 2025-05-16 DIAGNOSIS — R06.02 SHORTNESS OF BREATH: ICD-10-CM

## 2025-05-16 DIAGNOSIS — K21.9 GASTRO-ESOPHAGEAL REFLUX DISEASE W/OUT ESOPHAGITIS: ICD-10-CM

## 2025-05-16 DIAGNOSIS — J84.10 PULMONARY FIBROSIS, UNSPECIFIED: ICD-10-CM

## 2025-05-16 DIAGNOSIS — Z01.811 ENCOUNTER FOR PREPROCEDURAL RESPIRATORY EXAMINATION: ICD-10-CM

## 2025-05-16 DIAGNOSIS — J45.909 UNSPECIFIED ASTHMA, UNCOMPLICATED: ICD-10-CM

## 2025-05-16 PROCEDURE — ZZZZZ: CPT

## 2025-05-16 PROCEDURE — 94010 BREATHING CAPACITY TEST: CPT

## 2025-05-16 PROCEDURE — 94729 DIFFUSING CAPACITY: CPT

## 2025-05-16 PROCEDURE — 95012 NITRIC OXIDE EXP GAS DETER: CPT

## 2025-05-16 PROCEDURE — 99214 OFFICE O/P EST MOD 30 MIN: CPT | Mod: 25

## 2025-05-16 PROCEDURE — 94727 GAS DIL/WSHOT DETER LNG VOL: CPT

## 2025-05-22 ENCOUNTER — APPOINTMENT (OUTPATIENT)
Dept: ENDOCRINOLOGY | Facility: CLINIC | Age: 60
End: 2025-05-22

## 2025-06-13 ENCOUNTER — EMERGENCY (EMERGENCY)
Facility: HOSPITAL | Age: 60
LOS: 1 days | End: 2025-06-13
Attending: EMERGENCY MEDICINE | Admitting: EMERGENCY MEDICINE
Payer: MEDICARE

## 2025-06-13 VITALS
SYSTOLIC BLOOD PRESSURE: 138 MMHG | DIASTOLIC BLOOD PRESSURE: 77 MMHG | HEART RATE: 72 BPM | TEMPERATURE: 98 F | OXYGEN SATURATION: 96 % | RESPIRATION RATE: 20 BRPM

## 2025-06-13 VITALS
HEIGHT: 68.46 IN | DIASTOLIC BLOOD PRESSURE: 86 MMHG | OXYGEN SATURATION: 95 % | RESPIRATION RATE: 17 BRPM | HEART RATE: 97 BPM | SYSTOLIC BLOOD PRESSURE: 128 MMHG | TEMPERATURE: 98 F | WEIGHT: 250 LBS

## 2025-06-13 LAB
ADD ON TEST-SPECIMEN IN LAB: SIGNIFICANT CHANGE UP
ALBUMIN SERPL ELPH-MCNC: 4.1 G/DL — SIGNIFICANT CHANGE UP (ref 3.3–5)
ALP SERPL-CCNC: 79 U/L — SIGNIFICANT CHANGE UP (ref 40–120)
ALT FLD-CCNC: 45 U/L — HIGH (ref 4–41)
ANION GAP SERPL CALC-SCNC: 13 MMOL/L — SIGNIFICANT CHANGE UP (ref 7–14)
AST SERPL-CCNC: 31 U/L — SIGNIFICANT CHANGE UP (ref 4–40)
BASOPHILS # BLD AUTO: 0.06 K/UL — SIGNIFICANT CHANGE UP (ref 0–0.2)
BASOPHILS NFR BLD AUTO: 0.7 % — SIGNIFICANT CHANGE UP (ref 0–2)
BILIRUB SERPL-MCNC: 0.7 MG/DL — SIGNIFICANT CHANGE UP (ref 0.2–1.2)
BUN SERPL-MCNC: 17 MG/DL — SIGNIFICANT CHANGE UP (ref 7–23)
CALCIUM SERPL-MCNC: 9.3 MG/DL — SIGNIFICANT CHANGE UP (ref 8.4–10.5)
CHLORIDE SERPL-SCNC: 102 MMOL/L — SIGNIFICANT CHANGE UP (ref 98–107)
CO2 SERPL-SCNC: 24 MMOL/L — SIGNIFICANT CHANGE UP (ref 22–31)
CREAT SERPL-MCNC: 0.87 MG/DL — SIGNIFICANT CHANGE UP (ref 0.5–1.3)
EGFR: 99 ML/MIN/1.73M2 — SIGNIFICANT CHANGE UP
EGFR: 99 ML/MIN/1.73M2 — SIGNIFICANT CHANGE UP
EOSINOPHIL # BLD AUTO: 0.27 K/UL — SIGNIFICANT CHANGE UP (ref 0–0.5)
EOSINOPHIL NFR BLD AUTO: 3 % — SIGNIFICANT CHANGE UP (ref 0–6)
FLUAV AG NPH QL: SIGNIFICANT CHANGE UP
FLUBV AG NPH QL: SIGNIFICANT CHANGE UP
GLUCOSE SERPL-MCNC: 127 MG/DL — HIGH (ref 70–99)
HCT VFR BLD CALC: 47.4 % — SIGNIFICANT CHANGE UP (ref 39–50)
HGB BLD-MCNC: 14.5 G/DL — SIGNIFICANT CHANGE UP (ref 13–17)
IANC: 6.12 K/UL — SIGNIFICANT CHANGE UP (ref 1.8–7.4)
IMM GRANULOCYTES NFR BLD AUTO: 0.6 % — SIGNIFICANT CHANGE UP (ref 0–0.9)
LYMPHOCYTES # BLD AUTO: 1.9 K/UL — SIGNIFICANT CHANGE UP (ref 1–3.3)
LYMPHOCYTES # BLD AUTO: 21 % — SIGNIFICANT CHANGE UP (ref 13–44)
MCHC RBC-ENTMCNC: 20.1 PG — LOW (ref 27–34)
MCHC RBC-ENTMCNC: 30.6 G/DL — LOW (ref 32–36)
MCV RBC AUTO: 65.6 FL — LOW (ref 80–100)
MONOCYTES # BLD AUTO: 0.65 K/UL — SIGNIFICANT CHANGE UP (ref 0–0.9)
MONOCYTES NFR BLD AUTO: 7.2 % — SIGNIFICANT CHANGE UP (ref 2–14)
NEUTROPHILS # BLD AUTO: 6.12 K/UL — SIGNIFICANT CHANGE UP (ref 1.8–7.4)
NEUTROPHILS NFR BLD AUTO: 67.5 % — SIGNIFICANT CHANGE UP (ref 43–77)
NRBC # BLD AUTO: 0 K/UL — SIGNIFICANT CHANGE UP (ref 0–0)
NRBC # FLD: 0 K/UL — SIGNIFICANT CHANGE UP (ref 0–0)
NRBC BLD AUTO-RTO: 0 /100 WBCS — SIGNIFICANT CHANGE UP (ref 0–0)
NT-PROBNP SERPL-SCNC: <36 PG/ML — SIGNIFICANT CHANGE UP
PLATELET # BLD AUTO: 214 K/UL — SIGNIFICANT CHANGE UP (ref 150–400)
POTASSIUM SERPL-MCNC: 3.8 MMOL/L — SIGNIFICANT CHANGE UP (ref 3.5–5.3)
POTASSIUM SERPL-SCNC: 3.8 MMOL/L — SIGNIFICANT CHANGE UP (ref 3.5–5.3)
PROT SERPL-MCNC: 6.7 G/DL — SIGNIFICANT CHANGE UP (ref 6–8.3)
RBC # BLD: >7 M/UL — HIGH (ref 4.2–5.8)
RBC # FLD: 19.9 % — HIGH (ref 10.3–14.5)
RSV RNA NPH QL NAA+NON-PROBE: SIGNIFICANT CHANGE UP
SARS-COV-2 RNA SPEC QL NAA+PROBE: SIGNIFICANT CHANGE UP
SODIUM SERPL-SCNC: 139 MMOL/L — SIGNIFICANT CHANGE UP (ref 135–145)
SOURCE RESPIRATORY: SIGNIFICANT CHANGE UP
TROPONIN T, HIGH SENSITIVITY RESULT: 7 NG/L — SIGNIFICANT CHANGE UP
TROPONIN T, HIGH SENSITIVITY RESULT: 7 NG/L — SIGNIFICANT CHANGE UP
WBC # BLD: 9.05 K/UL — SIGNIFICANT CHANGE UP (ref 3.8–10.5)
WBC # FLD AUTO: 9.05 K/UL — SIGNIFICANT CHANGE UP (ref 3.8–10.5)

## 2025-06-13 PROCEDURE — 71046 X-RAY EXAM CHEST 2 VIEWS: CPT | Mod: 26

## 2025-06-13 PROCEDURE — 99285 EMERGENCY DEPT VISIT HI MDM: CPT | Mod: GC

## 2025-06-13 PROCEDURE — 93010 ELECTROCARDIOGRAM REPORT: CPT

## 2025-06-13 NOTE — ED ADULT TRIAGE NOTE - CHIEF COMPLAINT QUOTE
c/o palpitations, SOB that woke him up approx 2 AM today. Denies chest pain, n/v, dizziness. PMH: COPD, Anxiety.

## 2025-06-13 NOTE — ED PROVIDER NOTE - ATTENDING CONTRIBUTION TO CARE
59-year-old male past medical history of COPD, LANETTE, pretension.  Patient states he woke up at 2 AM feeling unwell felt like his heart was beating not normally.  Patient states prior to going to sleep he was feeling well.  He does follow with a cardiologist last stress test normal had an echo last year in September with an EF of 58%.  Patient states he has had a prior episode.  He denies any recent illness he states that his seasonal allergies have been bothering him recently.  No cough cold symptoms no recent travel no lower extremity swelling no chest pain no current shortness of breath.  Patient states that he is asymptomatic now.  Plan for EKG, labs, chest x-ray, cardiac monitoring and reassessment.  Signed out to oncoming team pending reassessment and dispo.

## 2025-06-13 NOTE — ED PROVIDER NOTE - CLINICAL SUMMARY MEDICAL DECISION MAKING FREE TEXT BOX
59-year-old male with past medical history significant for COPD, anxiety hypertension presents to ED with complaint of "iregular heart beat"/ "palpitations", shortness of breath, mild nausea which woke him at 2 AM. has improved from initial onset.patient follows w/ [facility] for cardiology, last stress test x 1 yr ago, echo september 2024 EF 58%. patient states dysimilar to anxiety, similar to previous episode where he went to cards and was placed on loop recorder w/o any diagnostic findings.no recent illness, travel, history of pe/dvt, calf pain. seen at bedside w/ mother. PE: aox3, cardiac EKG wnl, lungs CTA, abd soft non tender, LE minimal pedal edema. will get trop, tsh, labs to eval metabolic vs electrolyte abn, dispo pending workup, may benifit from outpatient f/u w/ loop recorder. no concern for PE at this time. 59-year-old male with past medical history significant for COPD, anxiety hypertension presents to ED with complaint of "irregular heart beat"/ "palpitations", shortness of breath, mild nausea which woke him at 2 AM. has improved from initial onset.patient follows w/ [facility] for cardiology, last stress test x 1 yr ago, echo september 2024 EF 58%. patient states dysimilar to anxiety, similar to previous episode where he went to cards and was placed on loop recorder w/o any diagnostic findings.no recent illness, travel, history of pe/dvt, calf pain. seen at bedside w/ mother. PE: aox3, cardiac EKG wnl, lungs CTA, abd soft non tender, LE minimal pedal edema. will get trop, tsh, labs to eval metabolic vs electrolyte abn, dispo pending workup, may benefit from outpatient f/u w/ loop recorder. no concern for PE at this time.

## 2025-06-13 NOTE — ED PROVIDER NOTE - PATIENT PORTAL LINK FT
You can access the FollowMyHealth Patient Portal offered by [facility] by registering at the following website: [email or web address]. By joining [facility]’s FollowMyHealth portal, you will also be able to view your health information using other applications (apps) compatible with our system.

## 2025-06-13 NOTE — ED PROVIDER NOTE - PROGRESS NOTE DETAILS
Reji, PGY3: Patient signed out to me by night team.    Lombardo - 59yM [12]  COPD, anxiety, TTE 9/2024  irregular HR, palps, n/discomfort  hx of loop recorder, [facility]  [ ] rpt trop (0720), flu w/ COVID, TSH, Mg/Ph    Troponin 7 (repeat pending). CXR showed clear lungs.  EKG was nonischemic per day team. Reji PGY3: On reassessment, patient reports no chest pain/discomfort or palpitations.  He endorses nauseousness that he has had for a few weeks.  He states that the nauseousness comes and goes with the weather.  Last echocardiogram was 9/2024 with no abnormal findings.  He previously wore a loop recorder which recorded extra beats, but no other abnormal findings.  He follows with Dr. Louis Sharma ([facility] cardiology).  Requested patient follow-up with them for persistent palpitations.  He also endorses occasional tingling sensation of hands, but states that he has carpal tunnel syndrome.  He denies any headaches, weakness of any extremity, vision changes.  Plan for discharge home with outpatient follow-up.  EKG shows normal sinus rhythm with no ischemic changes.

## 2025-06-13 NOTE — ED ADULT NURSE NOTE - OBJECTIVE STATEMENT
Pt received to room 12, A&Ox4, ambulatory. Pt presenting to the ED c/o palpitations. Pt states this began at 2am when he woke up "feeling like my HR is all over the place". Pt states the feeling has since subsided but occurs intermittently. Pt denies c/p, SOB, headache, blurry vision, n/v/d, abd pain, or fever like symptoms. Placed on cardiac monitor, NSR. 20G IV placed in the left AC, labs drawn and sent. Respirations even and unlabored, NAD noted on room air. Comfort measures provided, safety maintained, plan of care ongoing.

## 2025-06-13 NOTE — ED ADULT NURSE REASSESSMENT NOTE - NS ED NURSE REASSESS COMMENT FT1
Pt A&Ox4, resting comfortably in stretcher. Pt pending lab results, offers no complaints at this time, states palpitations have resolved. NSR on cardiac monitor. Respirations even and unlabored, NAD noted. Comfort measures provided, safety maintained. Plan of care ongoing.

## 2025-06-13 NOTE — ED PROVIDER NOTE - CARE PROVIDER_API CALL
Louis Sharma  Internal Medicine  [address] [place] [address], [facility] [address]  [place] [address]  Phone: [phone]  Fax: [phone]  Follow Up Time: 4-6 Days

## 2025-06-13 NOTE — ED PROVIDER NOTE - NSFOLLOWUPINSTRUCTIONS_ED_ALL_ED_FT
Reason for ED Visit:  - Palpitations and nauseous    Findings/Diagnosis:  - No evidence of acute coronary syndrome -normal sinus rhythm on EKG with stable/low troponin  - No electrolyte or cell count abnormalities    Please return to the ED immediately for any new, worsening, or concerning symptoms including, but not limited to:   - Chest pain especially worse with exertion   - Difficulty breathing   - Vomiting    Please follow up with your cardiologist regarding this ED visit.    Thank you for choosing us for your care.

## 2025-06-16 ENCOUNTER — APPOINTMENT (OUTPATIENT)
Dept: PSYCHIATRY | Facility: CLINIC | Age: 60
End: 2025-06-16
Payer: MEDICARE

## 2025-06-16 PROCEDURE — 99213 OFFICE O/P EST LOW 20 MIN: CPT | Mod: 2W

## 2025-07-11 ENCOUNTER — RX RENEWAL (OUTPATIENT)
Age: 60
End: 2025-07-11

## 2025-07-15 ENCOUNTER — APPOINTMENT (OUTPATIENT)
Dept: SURGERY | Facility: CLINIC | Age: 60
End: 2025-07-15
Payer: MEDICARE

## 2025-07-15 PROCEDURE — 99214 OFFICE O/P EST MOD 30 MIN: CPT | Mod: 25

## 2025-07-15 PROCEDURE — 36415 COLL VENOUS BLD VENIPUNCTURE: CPT

## 2025-07-17 LAB
T3 SERPL-MCNC: 96 NG/DL
T4 FREE SERPL-MCNC: 0.9 NG/DL
THYROGLOB AB SERPL-ACNC: <15 IU/ML
THYROPEROXIDASE AB SERPL IA-ACNC: 10.1 IU/ML
TSH SERPL-ACNC: 1.03 UIU/ML

## 2025-08-04 ENCOUNTER — APPOINTMENT (OUTPATIENT)
Dept: PSYCHIATRY | Facility: CLINIC | Age: 60
End: 2025-08-04
Payer: MEDICARE

## 2025-08-04 DIAGNOSIS — F41.9 ANXIETY DISORDER, UNSPECIFIED: ICD-10-CM

## 2025-08-04 PROCEDURE — G2211 COMPLEX E/M VISIT ADD ON: CPT | Mod: 2W

## 2025-08-04 PROCEDURE — 99213 OFFICE O/P EST LOW 20 MIN: CPT | Mod: 2W

## 2025-08-18 ENCOUNTER — NON-APPOINTMENT (OUTPATIENT)
Age: 60
End: 2025-08-18

## 2025-09-04 ENCOUNTER — NON-APPOINTMENT (OUTPATIENT)
Age: 60
End: 2025-09-04